# Patient Record
Sex: FEMALE | Race: BLACK OR AFRICAN AMERICAN | Employment: FULL TIME | URBAN - METROPOLITAN AREA
[De-identification: names, ages, dates, MRNs, and addresses within clinical notes are randomized per-mention and may not be internally consistent; named-entity substitution may affect disease eponyms.]

---

## 2022-10-14 ENCOUNTER — OFFICE VISIT (OUTPATIENT)
Dept: INTERNAL MEDICINE CLINIC | Age: 27
End: 2022-10-14
Payer: COMMERCIAL

## 2022-10-14 VITALS
RESPIRATION RATE: 18 BRPM | WEIGHT: 232.9 LBS | OXYGEN SATURATION: 98 % | TEMPERATURE: 98 F | HEIGHT: 67 IN | SYSTOLIC BLOOD PRESSURE: 118 MMHG | DIASTOLIC BLOOD PRESSURE: 74 MMHG | BODY MASS INDEX: 36.55 KG/M2 | HEART RATE: 100 BPM

## 2022-10-14 DIAGNOSIS — E78.5 HYPERLIPIDEMIA, UNSPECIFIED HYPERLIPIDEMIA TYPE: ICD-10-CM

## 2022-10-14 DIAGNOSIS — E11.3293 TYPE 2 DIABETES MELLITUS WITH BOTH EYES AFFECTED BY MILD NONPROLIFERATIVE RETINOPATHY WITHOUT MACULAR EDEMA, WITH LONG-TERM CURRENT USE OF INSULIN (HCC): Primary | ICD-10-CM

## 2022-10-14 DIAGNOSIS — Z11.59 ENCOUNTER FOR HEPATITIS C SCREENING TEST FOR LOW RISK PATIENT: ICD-10-CM

## 2022-10-14 DIAGNOSIS — Z23 ENCOUNTER FOR IMMUNIZATION: ICD-10-CM

## 2022-10-14 DIAGNOSIS — B97.7 HPV IN FEMALE: ICD-10-CM

## 2022-10-14 DIAGNOSIS — Z79.4 TYPE 2 DIABETES MELLITUS WITH BOTH EYES AFFECTED BY MILD NONPROLIFERATIVE RETINOPATHY WITHOUT MACULAR EDEMA, WITH LONG-TERM CURRENT USE OF INSULIN (HCC): Primary | ICD-10-CM

## 2022-10-14 DIAGNOSIS — E66.01 CLASS 2 SEVERE OBESITY DUE TO EXCESS CALORIES WITH SERIOUS COMORBIDITY AND BODY MASS INDEX (BMI) OF 36.0 TO 36.9 IN ADULT (HCC): ICD-10-CM

## 2022-10-14 DIAGNOSIS — Z79.899 ENCOUNTER FOR LONG-TERM (CURRENT) USE OF OTHER MEDICATIONS: ICD-10-CM

## 2022-10-14 PROCEDURE — 3046F HEMOGLOBIN A1C LEVEL >9.0%: CPT | Performed by: FAMILY MEDICINE

## 2022-10-14 PROCEDURE — 99204 OFFICE O/P NEW MOD 45 MIN: CPT | Performed by: FAMILY MEDICINE

## 2022-10-14 PROCEDURE — 90471 IMMUNIZATION ADMIN: CPT | Performed by: FAMILY MEDICINE

## 2022-10-14 PROCEDURE — 90686 IIV4 VACC NO PRSV 0.5 ML IM: CPT | Performed by: FAMILY MEDICINE

## 2022-10-14 RX ORDER — INSULIN ASPART 100 [IU]/ML
INJECTION, SOLUTION INTRAVENOUS; SUBCUTANEOUS
COMMUNITY
Start: 2022-09-05

## 2022-10-14 RX ORDER — INSULIN DEGLUDEC INJECTION 200 U/ML
INJECTION, SOLUTION SUBCUTANEOUS
COMMUNITY
Start: 2022-09-05

## 2022-10-14 RX ORDER — MEDROXYPROGESTERONE ACETATE 150 MG/ML
INJECTION, SUSPENSION INTRAMUSCULAR
COMMUNITY
Start: 2022-07-22

## 2022-10-14 RX ORDER — BLOOD-GLUCOSE SENSOR
EACH MISCELLANEOUS
COMMUNITY
Start: 2022-10-05

## 2022-10-14 RX ORDER — BLOOD-GLUCOSE TRANSMITTER
EACH MISCELLANEOUS
COMMUNITY
Start: 2022-09-05

## 2022-10-14 NOTE — PROGRESS NOTES
SPORTS MEDICINE AND PRIMARY CARE  Poonam Tuttle. MD [place]  [address] [address]    Chief Complaint   Patient presents with    Establish Care       SUBJECTIVE:    Shabana Moreno is a 32 y.o. female for new patient evaluation    T1dm dx 2010  Insulin pump interest  A1c 6/10/22 12.7%  Bilateral retinopathy, mild    Diabetic ROS: rare hypoglycemia,  no polyuria, no polydipsia, no blurred vision, +  left hand paresthesias, no weight changes or pruritis, no infections or skin issues;  feet and nails are intact     Cardiovascular ROS: taking medications as instructed, no medication side effects noted, no TIA's, no chest pain on exertion, no dyspnea on exertion, no swelling of ankles    Todays Fbs 219, ac lunch 240, random 319  Protein bar , water  Chick tarik tenders 4 piece, side salad, avocado lime , water    Care gaps  Covid  vacc x 3, pfizer  Tdap- utd, 2015  Pap smear- 6/2021  hpv hx      Ophth oct appt  Current Outpatient Medications   Medication Sig Dispense Refill    NovoLOG Flexpen U-100 Insulin 100 unit/mL (3 mL) inpn INJECT UP TO 60 UNITS DAILY AS DIRECTED BY PHYSICIAN      Tresiba FlexTouch U-200 200 unit/mL (3 mL) inpn pen INJECT 50 UNITS UNDER THE SKIN EVERY MORNING      medroxyPROGESTERone (DEPO-PROVERA) 150 mg/mL syrg ADMINISTER 1 ML IN THE MUSCLE 1 TIME EVERY 12 WEEKS      Dexcom G6 Sensor wagner       Dexcom G6 Transmitter wagner USE ONE BOX EVERY 3 MONTHS.        Past Medical History:   Diagnosis Date    Diabetes ([address]      Past Surgical History:   Procedure Laterality Date    HX CHOLECYSTECTOMY       Allergies   Allergen Reactions    Lisinopril Swelling       REVIEW OF SYSTEMS:  General: negative for - chills or fever  ENT: negative for - headaches, nasal congestion, tinnitus, hearing loss, vision changes, sore throat  Respiratory: negative for - cough, hemoptysis, shortness of breath or wheezing  Cardiovascular : negative for - chest pain, edema, palpitations or shortness of breath  Gastrointestinal: negative for - abdominal pain, blood in stools, heartburn or nausea/vomiting, diarrhea, constipation  Genito-Urinary: no dysuria, trouble voiding, hematuria Musculoskeletal: negative for - gait disturbance, joint pain, joint stiffness , joint swelling, muscle aches  Neurological: no TIA or stroke symptoms  Hematologic: no bruises, no bleeding, no swollen glands  Integument: no lumps, mole changes, nail changes or rash  Endocrine:no malaise/lethargy or unexpected weight changes      Social History     Socioeconomic History    Marital status: SINGLE   Tobacco Use    Smoking status: Never    Smokeless tobacco: Never   Substance and Sexual Activity    Alcohol use: Not Currently    Drug use: Never     No family history on file. OBJECTIVE:     Visit Vitals  /74 (BP 1 Location: Right arm, BP Patient Position: Sitting)   Pulse 100   Temp 98 °F (36.7 °C) (Oral)   Resp 18   Ht 5' 7\" (1.702 m)   Wt 232 lb 14.4 oz (105.6 kg)   SpO2 98%   BMI 36.48 kg/m²       No results found for any previous visit. Appearance: alert, well appearing, and in no distress. General exam: CVS exam BP noted to be well controlled today in office, S1, S2 normal, no gallop, no murmur, chest clear, no JVD, no HSM, no edema,  peripheral vascular exam both carotids normal upstroke without bruits, radial pulses normal, pedal pulses normal both DP's and PT's, neurological exam alert, oriented, normal speech, no focal findings or movement disorder noted. ASSESSMENT/PLAN:  1. Type 2 diabetes mellitus with both eyes affected by mild nonproliferative retinopathy without macular edema, with long-term current use of insulin (HCC)      Need to assess control with a1c    .   Orders Placed This Encounter    REFERRAL TO ENDOCRINOLOGY    NovoLOG Flexpen U-100 Insulin 100 unit/mL (3 mL) inpn    Tresiba FlexTouch U-200 200 unit/mL (3 mL) inpn pen    medroxyPROGESTERone (DEPO-PROVERA) 150 mg/mL syrg    Dexcom G6 Sensor wagner Dexcom G6 Transmitter wagner           I have discussed the diagnosis with the patient and the intended plan as seen in the  orders above. The patient understands and agrees with the plan. The patient has   received an after visit summary. Questions were answered concerning  future plans  Patient labs and/or xrays were reviewed as available. Past records were reviewed as available. Counseled regarding diet, exercise and healthy lifestyle          Advised patient to proceed to urgent care, call back or return to office if symptoms develop/worsen/change/persist.  Discussed expected course/resolution/complications of diagnosis in detail with patient. Medication risks/benefits/costs/interactions/alternatives reviewed    Signed,  Amanda Oro M.D. This note was created using voice recognition software.   Edits have been made but syntax errors might exist.

## 2022-10-15 LAB
ALBUMIN SERPL-MCNC: 3.6 G/DL (ref 3.5–5)
ALBUMIN/GLOB SERPL: 1.1 {RATIO} (ref 1.1–2.2)
ALP SERPL-CCNC: 158 U/L (ref 45–117)
ALT SERPL-CCNC: 29 U/L (ref 12–78)
ANION GAP SERPL CALC-SCNC: 5 MMOL/L (ref 5–15)
APPEARANCE UR: CLEAR
AST SERPL-CCNC: 15 U/L (ref 15–37)
BASOPHILS # BLD: 0.1 K/UL (ref 0–0.1)
BASOPHILS NFR BLD: 1 % (ref 0–1)
BILIRUB SERPL-MCNC: 0.2 MG/DL (ref 0.2–1)
BILIRUB UR QL: NEGATIVE
BUN SERPL-MCNC: 11 MG/DL (ref 6–20)
BUN/CREAT SERPL: 13 (ref 12–20)
CALCIUM SERPL-MCNC: 9.4 MG/DL (ref 8.5–10.1)
CHLORIDE SERPL-SCNC: 108 MMOL/L (ref 97–108)
CHOLEST SERPL-MCNC: 162 MG/DL
CO2 SERPL-SCNC: 29 MMOL/L (ref 21–32)
COLOR UR: ABNORMAL
COMMENT, HOLDF: NORMAL
CREAT SERPL-MCNC: 0.85 MG/DL (ref 0.55–1.02)
CREAT UR-MCNC: 143 MG/DL
DIFFERENTIAL METHOD BLD: ABNORMAL
EOSINOPHIL # BLD: 0.3 K/UL (ref 0–0.4)
EOSINOPHIL NFR BLD: 3 % (ref 0–7)
ERYTHROCYTE [DISTWIDTH] IN BLOOD BY AUTOMATED COUNT: 13.8 % (ref 11.5–14.5)
EST. AVERAGE GLUCOSE BLD GHB EST-MCNC: 326 MG/DL
GLOBULIN SER CALC-MCNC: 3.3 G/DL (ref 2–4)
GLUCOSE SERPL-MCNC: 212 MG/DL (ref 65–100)
GLUCOSE UR STRIP.AUTO-MCNC: >1000 MG/DL
HBA1C MFR BLD: 13 % (ref 4–5.6)
HCT VFR BLD AUTO: 43.4 % (ref 35–47)
HCV AB SERPL QL IA: NONREACTIVE
HDLC SERPL-MCNC: 55 MG/DL
HDLC SERPL: 2.9 {RATIO} (ref 0–5)
HGB BLD-MCNC: 13.4 G/DL (ref 11.5–16)
HGB UR QL STRIP: NEGATIVE
IMM GRANULOCYTES # BLD AUTO: 0 K/UL (ref 0–0.04)
IMM GRANULOCYTES NFR BLD AUTO: 0 % (ref 0–0.5)
KETONES UR QL STRIP.AUTO: NEGATIVE MG/DL
LDLC SERPL CALC-MCNC: 61.4 MG/DL (ref 0–100)
LEUKOCYTE ESTERASE UR QL STRIP.AUTO: NEGATIVE
LYMPHOCYTES # BLD: 2.2 K/UL (ref 0.8–3.5)
LYMPHOCYTES NFR BLD: 23 % (ref 12–49)
MCH RBC QN AUTO: 25 PG (ref 26–34)
MCHC RBC AUTO-ENTMCNC: 30.9 G/DL (ref 30–36.5)
MCV RBC AUTO: 81.1 FL (ref 80–99)
MICROALBUMIN UR-MCNC: 1.01 MG/DL
MICROALBUMIN/CREAT UR-RTO: 7 MG/G (ref 0–30)
MONOCYTES # BLD: 0.5 K/UL (ref 0–1)
MONOCYTES NFR BLD: 5 % (ref 5–13)
NEUTS SEG # BLD: 6.8 K/UL (ref 1.8–8)
NEUTS SEG NFR BLD: 68 % (ref 32–75)
NITRITE UR QL STRIP.AUTO: NEGATIVE
NRBC # BLD: 0 K/UL (ref 0–0.01)
NRBC BLD-RTO: 0 PER 100 WBC
PH UR STRIP: 6.5 [PH] (ref 5–8)
PLATELET # BLD AUTO: 399 K/UL (ref 150–400)
PMV BLD AUTO: 11 FL (ref 8.9–12.9)
POTASSIUM SERPL-SCNC: 4.4 MMOL/L (ref 3.5–5.1)
PROT SERPL-MCNC: 6.9 G/DL (ref 6.4–8.2)
PROT UR STRIP-MCNC: NEGATIVE MG/DL
RBC # BLD AUTO: 5.35 M/UL (ref 3.8–5.2)
SAMPLES BEING HELD,HOLD: NORMAL
SODIUM SERPL-SCNC: 142 MMOL/L (ref 136–145)
SP GR UR REFRACTOMETRY: 1.02 (ref 1–1.03)
TRIGL SERPL-MCNC: 228 MG/DL (ref ?–150)
TSH SERPL DL<=0.05 MIU/L-ACNC: 1.06 UIU/ML (ref 0.36–3.74)
UROBILINOGEN UR QL STRIP.AUTO: 0.2 EU/DL (ref 0.2–1)
VLDLC SERPL CALC-MCNC: 45.6 MG/DL
WBC # BLD AUTO: 9.9 K/UL (ref 3.6–11)

## 2022-12-16 NOTE — PROGRESS NOTES
Alert pt:  Diabetic control is very poor. Follow up is needed very soon. Diabetic specialty referral remains unscheduled. Feel free to set your appointment information is located in [address] [place] [address] [address].

## 2022-12-20 NOTE — PROGRESS NOTES
Pt notified of the above. Pt states that she has scheduled an appointment with Endocrine and her appointment is at the end of January. States she has been a diabetic for awhile and her A1C has been running between 11 and 14. States her BS this morning was 215 and   Has been running in the 200's. States she will call to schedule an appointment with one of the other providers here in the office.

## 2023-01-22 ENCOUNTER — APPOINTMENT (OUTPATIENT)
Dept: CT IMAGING | Age: 28
End: 2023-01-22
Attending: EMERGENCY MEDICINE
Payer: COMMERCIAL

## 2023-01-22 ENCOUNTER — HOSPITAL ENCOUNTER (EMERGENCY)
Age: 28
Discharge: HOME | End: 2023-01-22
Attending: EMERGENCY MEDICINE
Payer: COMMERCIAL

## 2023-01-22 VITALS
TEMPERATURE: 97.4 F | OXYGEN SATURATION: 96 % | SYSTOLIC BLOOD PRESSURE: 144 MMHG | RESPIRATION RATE: 20 BRPM | DIASTOLIC BLOOD PRESSURE: 84 MMHG | HEART RATE: 86 BPM

## 2023-01-22 DIAGNOSIS — R10.31 ABDOMINAL PAIN, RIGHT LOWER QUADRANT: ICD-10-CM

## 2023-01-22 DIAGNOSIS — R10.9 RIGHT FLANK PAIN: Primary | ICD-10-CM

## 2023-01-22 LAB
ALBUMIN SERPL-MCNC: 3.4 G/DL (ref 3.5–5)
ALBUMIN/GLOB SERPL: 0.9 (ref 1.1–2.2)
ALP SERPL-CCNC: 143 U/L (ref 45–117)
ALT SERPL-CCNC: 30 U/L (ref 12–78)
ANION GAP SERPL CALC-SCNC: 4 MMOL/L (ref 5–15)
APPEARANCE UR: CLEAR
AST SERPL-CCNC: 19 U/L (ref 15–37)
BACTERIA URNS QL MICRO: ABNORMAL /HPF
BASOPHILS # BLD: 0.1 K/UL (ref 0–0.1)
BASOPHILS NFR BLD: 1 % (ref 0–1)
BILIRUB SERPL-MCNC: 0.3 MG/DL (ref 0.2–1)
BILIRUB UR QL: NEGATIVE
BUN SERPL-MCNC: 15 MG/DL (ref 6–20)
BUN/CREAT SERPL: 22 (ref 12–20)
CALCIUM SERPL-MCNC: 9.4 MG/DL (ref 8.5–10.1)
CHLORIDE SERPL-SCNC: 110 MMOL/L (ref 97–108)
CO2 SERPL-SCNC: 28 MMOL/L (ref 21–32)
COLOR UR: ABNORMAL
CREAT SERPL-MCNC: 0.69 MG/DL (ref 0.55–1.02)
DIFFERENTIAL METHOD BLD: ABNORMAL
EOSINOPHIL # BLD: 0.1 K/UL (ref 0–0.4)
EOSINOPHIL NFR BLD: 1 % (ref 0–7)
EPITH CASTS URNS QL MICRO: ABNORMAL /LPF
ERYTHROCYTE [DISTWIDTH] IN BLOOD BY AUTOMATED COUNT: 13.7 % (ref 11.5–14.5)
EST. AVERAGE GLUCOSE BLD GHB EST-MCNC: 335 MG/DL
GLOBULIN SER CALC-MCNC: 3.7 G/DL (ref 2–4)
GLUCOSE SERPL-MCNC: 115 MG/DL (ref 65–100)
GLUCOSE UR STRIP.AUTO-MCNC: NEGATIVE MG/DL
HBA1C MFR BLD: 13.3 % (ref 4–5.6)
HCG UR QL: NEGATIVE
HCT VFR BLD AUTO: 41.1 % (ref 35–47)
HGB BLD-MCNC: 12.5 G/DL (ref 11.5–16)
HGB UR QL STRIP: NEGATIVE
IMM GRANULOCYTES # BLD AUTO: 0 K/UL (ref 0–0.04)
IMM GRANULOCYTES NFR BLD AUTO: 0 % (ref 0–0.5)
KETONES UR QL STRIP.AUTO: NEGATIVE MG/DL
LEUKOCYTE ESTERASE UR QL STRIP.AUTO: NEGATIVE
LIPASE SERPL-CCNC: 69 U/L (ref 73–393)
LYMPHOCYTES # BLD: 2.2 K/UL (ref 0.8–3.5)
LYMPHOCYTES NFR BLD: 24 % (ref 12–49)
MCH RBC QN AUTO: 24.7 PG (ref 26–34)
MCHC RBC AUTO-ENTMCNC: 30.4 G/DL (ref 30–36.5)
MCV RBC AUTO: 81.2 FL (ref 80–99)
MONOCYTES # BLD: 0.6 K/UL (ref 0–1)
MONOCYTES NFR BLD: 6 % (ref 5–13)
NEUTS SEG # BLD: 6.5 K/UL (ref 1.8–8)
NEUTS SEG NFR BLD: 68 % (ref 32–75)
NITRITE UR QL STRIP.AUTO: NEGATIVE
NRBC # BLD: 0 K/UL (ref 0–0.01)
NRBC BLD-RTO: 0 PER 100 WBC
PH UR STRIP: 6 (ref 5–8)
PLATELET # BLD AUTO: 360 K/UL (ref 150–400)
PMV BLD AUTO: 9.8 FL (ref 8.9–12.9)
POTASSIUM SERPL-SCNC: 4.1 MMOL/L (ref 3.5–5.1)
PROT SERPL-MCNC: 7.1 G/DL (ref 6.4–8.2)
PROT UR STRIP-MCNC: NEGATIVE MG/DL
RBC # BLD AUTO: 5.06 M/UL (ref 3.8–5.2)
RBC #/AREA URNS HPF: ABNORMAL /HPF (ref 0–5)
SODIUM SERPL-SCNC: 142 MMOL/L (ref 136–145)
SP GR UR REFRACTOMETRY: 1.01 (ref 1–1.03)
UR CULT HOLD, URHOLD: NORMAL
UROBILINOGEN UR QL STRIP.AUTO: 0.2 EU/DL (ref 0.2–1)
WBC # BLD AUTO: 9.5 K/UL (ref 3.6–11)
WBC URNS QL MICRO: ABNORMAL /HPF (ref 0–4)

## 2023-01-22 PROCEDURE — 74011250636 HC RX REV CODE- 250/636: Performed by: EMERGENCY MEDICINE

## 2023-01-22 PROCEDURE — 80053 COMPREHEN METABOLIC PANEL: CPT

## 2023-01-22 PROCEDURE — 96374 THER/PROPH/DIAG INJ IV PUSH: CPT

## 2023-01-22 PROCEDURE — 74011000250 HC RX REV CODE- 250: Performed by: EMERGENCY MEDICINE

## 2023-01-22 PROCEDURE — 81025 URINE PREGNANCY TEST: CPT

## 2023-01-22 PROCEDURE — 36415 COLL VENOUS BLD VENIPUNCTURE: CPT

## 2023-01-22 PROCEDURE — 99284 EMERGENCY DEPT VISIT MOD MDM: CPT

## 2023-01-22 PROCEDURE — 96375 TX/PRO/DX INJ NEW DRUG ADDON: CPT

## 2023-01-22 PROCEDURE — 74176 CT ABD & PELVIS W/O CONTRAST: CPT

## 2023-01-22 PROCEDURE — 83036 HEMOGLOBIN GLYCOSYLATED A1C: CPT

## 2023-01-22 PROCEDURE — 85025 COMPLETE CBC W/AUTO DIFF WBC: CPT

## 2023-01-22 PROCEDURE — 81001 URINALYSIS AUTO W/SCOPE: CPT

## 2023-01-22 PROCEDURE — 83690 ASSAY OF LIPASE: CPT

## 2023-01-22 RX ORDER — LIDOCAINE 50 MG/G
PATCH TOPICAL
Qty: 5 EACH | Refills: 1 | Status: SHIPPED | OUTPATIENT
Start: 2023-01-22

## 2023-01-22 RX ORDER — DICYCLOMINE HYDROCHLORIDE 20 MG/1
20 TABLET ORAL EVERY 6 HOURS
Qty: 20 TABLET | Refills: 0 | Status: SHIPPED | OUTPATIENT
Start: 2023-01-22

## 2023-01-22 RX ORDER — ONDANSETRON 2 MG/ML
4 INJECTION INTRAMUSCULAR; INTRAVENOUS
Status: COMPLETED | OUTPATIENT
Start: 2023-01-22 | End: 2023-01-22

## 2023-01-22 RX ORDER — KETOROLAC TROMETHAMINE 30 MG/ML
15 INJECTION, SOLUTION INTRAMUSCULAR; INTRAVENOUS
Status: COMPLETED | OUTPATIENT
Start: 2023-01-22 | End: 2023-01-22

## 2023-01-22 RX ADMIN — ONDANSETRON HYDROCHLORIDE 4 MG: 2 SOLUTION INTRAMUSCULAR; INTRAVENOUS at 12:53

## 2023-01-22 RX ADMIN — FAMOTIDINE 20 MG: 10 INJECTION, SOLUTION INTRAVENOUS at 12:56

## 2023-01-22 RX ADMIN — KETOROLAC TROMETHAMINE 15 MG: 30 INJECTION, SOLUTION INTRAMUSCULAR; INTRAVENOUS at 12:55

## 2023-01-22 NOTE — Clinical Note
[address]  [address] [facility] [address]  [phone]    Work/School Note    Date: 1/22/2023    To Whom It May concern:    Fernando Foster was seen and treated today in the emergency room by the following provider(s):  Attending Provider: Roberth Quesada MD  Nurse Practitioner: Harini Morris NP. Fernando Foster is excused from work/school on 1/22/2023 through 1/24/2023. She is medically clear to return to work/school on 1/25/2023.          Sincerely,          Clem Felder NP

## 2023-01-22 NOTE — ED PROVIDER NOTES
Flank Pain   Associated symptoms include abdominal pain. Pertinent negatives include no chest pain, no fever, no headaches and no dysuria. Patient is a 80-year-old female with past medical history significant for type 2 diabetes on insulin who presents to the ED with abrupt onset of right flank pain that radiates to the right lower quadrant since last evening. She was evaluated at an urgent care earlier today and referred to the emergency room for further evaluation and treatment. She denies any falls, direct injury or blunt trauma. Denies fever, cough, cold symptoms, headache, neck pain, visual changes, focal weakness or rash. Denies any difficulty breathing, difficulty swallowing, SOB or chest pain. Denies any nausea, vomiting or diarrhea. Pt. Reports she has not had any pain medications today prior to arrival.  Old charts reviewed. Pt is on Depo-Provera birth control. Past Medical History:   Diagnosis Date    Diabetes [facility])        Past Surgical History:   Procedure Laterality Date    HX CHOLECYSTECTOMY           History reviewed. No pertinent family history.     Social History     Socioeconomic History    Marital status: SINGLE     Spouse name: Not on file    Number of children: Not on file    Years of education: Not on file    Highest education level: Not on file   Occupational History    Not on file   Tobacco Use    Smoking status: Never    Smokeless tobacco: Never   Substance and Sexual Activity    Alcohol use: Not Currently    Drug use: Never    Sexual activity: Not on file   Other Topics Concern    Not on file   Social History Narrative    Not on file     Social Determinants of Health     Financial Resource Strain: Not on file   Food Insecurity: Not on file   Transportation Needs: Not on file   Physical Activity: Not on file   Stress: Not on file   Social Connections: Not on file   Intimate Partner Violence: Not on file   Housing Stability: Not on file         ALLERGIES: Lisinopril    Review of Systems   Constitutional:  Negative for activity change, appetite change, fever and unexpected weight change. HENT:  Negative for congestion, rhinorrhea and sore throat. Eyes:  Negative for visual disturbance. Respiratory:  Negative for cough and shortness of breath. Cardiovascular:  Negative for chest pain, palpitations and leg swelling. Gastrointestinal:  Positive for abdominal pain. Negative for constipation, diarrhea, nausea and vomiting. Genitourinary:  Positive for flank pain. Negative for dysuria, hematuria, vaginal bleeding and vaginal discharge. Musculoskeletal:  Positive for back pain. Negative for myalgias. Skin:  Negative for rash. Neurological:  Negative for headaches. All other systems reviewed and are negative. Vitals:    01/22/23 1212   BP: (!) 144/84   Pulse: 86   Resp: 20   Temp: 97.4 °F (36.3 °C)   SpO2: 96%            Physical Exam  Vitals and nursing note reviewed. Constitutional:       General: She is not in acute distress. Appearance: Normal appearance. She is not ill-appearing, toxic-appearing or diaphoretic. Comments: Female ; non smoker; works at [employer]  [phone]:      Head: Normocephalic and atraumatic. Right Ear: Tympanic membrane normal.      Nose: Nose normal.      Mouth/Throat:      Mouth: Mucous membranes are moist.      Pharynx: No posterior oropharyngeal erythema. Cardiovascular:      Rate and Rhythm: Normal rate and regular rhythm. Pulmonary:      Effort: Pulmonary effort is normal.      Breath sounds: Normal breath sounds. Abdominal:      General: Bowel sounds are normal. There is no distension. Palpations: Abdomen is soft. Tenderness: There is abdominal tenderness. There is no guarding or rebound. Hernia: No hernia is present. Comments: Mid to Right lower quadrant tenderness   Musculoskeletal:         General: Tenderness present. Cervical back: Normal range of motion and neck supple. No tenderness.       Comments: Reports right flank tenderness with radiculopathy to the right lower quadrant of the abdomen; Skin integrity is intact. There is no obvious bony or soft tissue deformity; no rash, bruising or erythema. Good neurovascular sensation. Lymphadenopathy:      Cervical: No cervical adenopathy. Skin:     General: Skin is warm and dry. Findings: No bruising, erythema or rash. Neurological:      Mental Status: She is alert and oriented to person, place, and time. Medical Decision Making  Amount and/or Complexity of Data Reviewed  Labs: ordered. Radiology: ordered. Risk  Prescription drug management. Procedures      Labs Reviewed   URINALYSIS W/MICROSCOPIC - Abnormal; Notable for the following components:       Result Value    Bacteria 1+ (*)     All other components within normal limits   CBC WITH AUTOMATED DIFF - Abnormal; Notable for the following components:    MCH 24.7 (*)     All other components within normal limits   METABOLIC PANEL, COMPREHENSIVE - Abnormal; Notable for the following components:    Chloride 110 (*)     Anion gap 4 (*)     Glucose 115 (*)     BUN/Creatinine ratio 22 (*)     Alk. phosphatase 143 (*)     Albumin 3.4 (*)     A-G Ratio 0.9 (*)     All other components within normal limits   LIPASE - Abnormal; Notable for the following components:    Lipase 69 (*)     All other components within normal limits   HEMOGLOBIN A1C WITH EAG - Abnormal; Notable for the following components:    Hemoglobin A1c 13.3 (*)     All other components within normal limits   URINE CULTURE HOLD SAMPLE   HCG URINE, QL. - POC     CT ABD PELV WO CONT    Result Date: 1/22/2023  No acute findings in the abdomen/pelvis. No evidence of kidney stones. Patient has been reexamined and reports some relief of with medications given. Recommend close follow-up with PCP and/or endocrinology for further evaluation and treatment.      1:40 PM  Patient's results and plan of care have been reviewed with her.   Patient has verbally conveyed her understanding and agreement of her signs, symptoms, diagnosis, treatment and prognosis and additionally agrees to follow up as recommended or return to the Emergency Room should her condition change prior to follow-up. Discharge instructions have also been provided to the patient with some educational information regarding her diagnosis as well a list of reasons why she would want to return to the ER prior to her follow-up appointment should her condition change. Kate Crews, NP

## 2023-01-22 NOTE — ED TRIAGE NOTES
Pt referred by [facility]  for RIGHT flank pain that radiates to RIGHT abd. Denies urinary s/s.  Denies n/v/d.

## 2023-01-31 ENCOUNTER — VIRTUAL VISIT (OUTPATIENT)
Dept: ENDOCRINOLOGY | Age: 28
End: 2023-01-31
Payer: COMMERCIAL

## 2023-01-31 DIAGNOSIS — E78.2 MIXED HYPERLIPIDEMIA: ICD-10-CM

## 2023-01-31 DIAGNOSIS — E10.649 HYPOGLYCEMIA UNAWARENESS ASSOCIATED WITH TYPE 1 DIABETES MELLITUS (HCC): ICD-10-CM

## 2023-01-31 DIAGNOSIS — E10.65 TYPE 1 DIABETES MELLITUS WITH HYPERGLYCEMIA (HCC): Primary | ICD-10-CM

## 2023-01-31 PROCEDURE — 99204 OFFICE O/P NEW MOD 45 MIN: CPT | Performed by: GENERAL ACUTE CARE HOSPITAL

## 2023-01-31 PROCEDURE — 3046F HEMOGLOBIN A1C LEVEL >9.0%: CPT | Performed by: GENERAL ACUTE CARE HOSPITAL

## 2023-01-31 RX ORDER — INSULIN PMP CART,AUT,G6/7,CNTR
1 EACH SUBCUTANEOUS
Qty: 1 KIT | Refills: 0 | Status: SHIPPED | OUTPATIENT
Start: 2023-01-31

## 2023-01-31 RX ORDER — INSULIN PMP CART,AUT,G6/7,CNTR
10 EACH SUBCUTANEOUS
Qty: 10 EACH | Refills: 12 | Status: SHIPPED | OUTPATIENT
Start: 2023-01-31

## 2023-01-31 NOTE — PATIENT INSTRUCTIONS
BS targets :  AM   Rest of the day 200    Take:  Tresiba  44 units at bedtime  Novolog 20 units and sliding scale for breakfast and lunch and take more units for dinner by 2 to 3 units if drinking soda, you can cut back if not drinking soda or smaller food portions  Correction Scale:   1 unit for every 40 above 150    IF GLUCOSE IS:                 THEN TAKE:      0   Extra Unit  151-190   1   Extra Unit  191-230   2   Extra Units  231-270   3   Extra Units  271-310   4   Extra Units  311-350   5   Extra Units  >350    6   Extra Units    Example: My planned insulin dose:    ____ Units    +    ____ Extra Correction Units  =  ____ total units to take together as one injection. Please send the Dexcom report pdf file to me by email  Please remember to message me through [employer] your [employer] company for Dexcom supplies    INFORMATION FOR NEW DIABETES PATIENTS ON INSULIN:    I would like to welcome you to our Diabetes & Endocrinology clinic. We want to do our best to help you take the best care of your diabetes. I would like for you to read this fact sheet which will have some important information for you regarding your treatment with insulin. What is my HbA1c (hemoglobin A1c) ? Blood sugar is very sticky and if left elevated for long enough, it will stick to just about everything in your body. This includes enzymes which can no longer function properly and the lining of your blood vessels which can get damaged and result in damaged organs. It also sticks to your hemoglobin when your red blood cells are being produced and measuring this can provide us with a good idea of what your blood sugar average has been over the past 3 months. Most of the time we aim for a HbA1c value of 7% but this can be different for certain people under certain circumstances. What kinds of insulin are usually used ?   Many years ago the types of insulin available were limited but now there are several different options to use. The important thing to know is that there are SHORT acting insulins which are used to treat the glucose elevation from your meals, and there are LONG acting insulins which are used as a basal or background insulin that provide a background sugar control throughout the day and night. You may be only on a LONG acting insulin, or you may be on a combination of LONG and SHORT acting insulins. It is important that during and following each visit you have a good understanding of your own personal regimen. In some instances there are pre-mixed insulins in which a short and intermediate acting insulin are combined in one vial or pen. Why do I need to keep a glucose log ? It is not possible to properly make changes to your insulin dose unless we know what your glucose values are at home. Using your HbA1c we can only have a general idea of whether your glucose has been controlled or uncontrolled, but it will not inform us on your day-to-day and [id number] blood sugar control. If you present to clinic without your glucose log, you may be wasting your visit rather than having a more meaningful visit since medication adjustments will be limited. What do I do if I have persistently HIGH or LOW glucose at home between my visits ? It is not necessary to wait until your next appointment before making any changes in your insulin dose if you are having persistent high or low blood sugar at home. As discussed in clinic today, we would like to aim for a fasting glucose goal/target of 130-150 in the AM and also at bedtime, while avoiding any hypoglycemia (low glucose level). For persistent hyperglycemia (Highs) related to meals, defined as being above your glucose target, increase your mealtime insulin by 1 unit every 3-4 days as appropriate until the target is achieved. For persistent mild hypoglycemia (Lows), decrease the dose instead.     For persistent hyperglycemia not related to meals, as can be interpreted by your fasting AM glucose, increase your once daily long acting insulin by 1 unit every 3-4 days until the target fasting glucose has been achieved. For persistent mild hypoglycemia, decrease the dose instead. If your blood glucose is persistently low, if you are having any related symptoms, or if you are just not certain of how to adjust your insulin, please notify your doctor for advise on dose adjustment. How to treat low blood glucose ? 1. Consume 15-20 grams of glucose or simple carbohydrates. 2. Recheck your blood glucose after 15 minutes. 3. If hypoglycemia continues, repeat. 4. Once blood glucose returns to normal, eat a small snack if your next planned meal or snack is more than an hour or two away. What is a Sliding Scale ? Generally a sliding scale is just a set of instructions for how much insulin to take for a specific glucose range. This generally is not often used anymore because a sliding scale does not adequately treat your meal as it is intended. HOWEVER we do use a version of the sliding scale, known as a CORRECTION SCALE, and this is used IN ADDITION to your scheduled mealtime insulin to account for a blood glucose which is already high before eating the meal. Typically it will be a set of instructions which advise you of how much more insulin to ADD to your mealtime insulin dose if your glucose is already above goal. Not everyone has a correction scale, however you may be advised of one in the future. What other things should I do to always be prepared ? Glucose tablets. Thats right, you need to be prepared just in case you get low blood sugar which can cause you to have very uncomfortable symptoms. Generally it is a good idea to keep some in your car, in your bedroom, and at work/school just in case. Diabetes ID. It is important for others to know that you are diabetic and on insulin in case for some reason you are not able to communicate with others.  This can happen if you pass out due to severely low blood sugar for example. IDs come as bracelets, necklaces, and dog tags. Check with your pharmacy about obtaining an ID and wear it wherever you go. It will be important to continue checking your glucose just as you did previously. I would like you at the very least to check you glucose during:   + AM fasting before breakfast   + Dinner time   + Bedtime  And any other time that you are not feeling well. Always provide a glucose log that is completed at every visit so that we can review the results of your home glucose together. Without this, it is not possible to make accurate changes to your insulin doses. Please consider checking with your pharmacy about obtaining a diabetes medical alert ID, if you have not already. This can come in the form of a bracelet or \"dog tags\". It is important for others to know that you are diabetic on insulin, especially if you become ill and are unable to speak. Diabetes and Meal Planning    Meal planning can be a key part of managing diabetes. Planning meals and snacks with the right balance of carbohydrate, protein, and fat can help you keep your blood sugar at the target level. You don't have to eat special foods. You can eat what your family eats, including sweets once in a while. But you do have to pay attention to how often you eat and how much you eat of certain foods. Your plate  The plate format is a simple way to help you manage how you eat. You plan meals by learning how much space each food should take on a plate. It can make it easier to keep your blood sugar level within your target range. It also helps you see if you're eating healthy portion sizes. To use the plate format, you put non-starchy vegetables on half your plate. Add lean protein foods, such as fish, lean meats and poultry, or soy products, on one-quarter of the plate.  Put a grain or starchy vegetable (such as brown rice or a potato) on the final quarter of the plate. You can add a small piece of fruit and some low-fat or fat-free milk or yogurt, depending on your carbohydrate goal for each meal.  Make sure that you are not using an oversized plate. A 9-inch plate is best.    Carbohydrates  Carbohydrate raises blood sugar higher and more quickly than any other nutrient. It is found in desserts, breads and cereals, and fruit. It's also found in starchy vegetables such as potatoes and corn, grains such as rice and pasta, and milk and yogurt. You can help keep your blood sugar levels within your target range by planning how much carbohydrate to have at meals and snacks. The amount you need depends on several things. These include your weight, how active you are, which diabetes medicines you take, and what your goals are for your blood sugar levels. An example of a carbohydrate counting plan is:  45 to 60 grams at each meal. That's about the same as 3 to 4 carbohydrate servings. 15 to 20 grams at each snack. That's about the same as 1 carbohydrate serving. The Nutrition Facts label on packaged foods tells you how much carbohydrate is in a serving of the food. First, look at the serving size on the food label. All of the nutrition information on a food label is based on that serving size. For foods that don't come with labels, such as fresh fruits and vegetables, you'll need a guide that lists carbohydrate in these foods. You may use an alex on your smart phone called [employer]. How can you plan healthy meals? Here are some tips to get started:  Plan your meals a week at a time. Don't forget to include snacks too. Use cookbooks or online recipes to plan several main meals. Plan some quick meals for busy nights. You also can double some recipes that freeze well. Then you can save half for other busy nights when you don't have time to cook. Make sure you have the ingredients you need for your recipes.  If you're running low on basic items, put these items on your shopping list too. List foods that you use to make breakfasts, lunches, and snacks. List plenty of fruits and vegetables. Post this list on the refrigerator. Add to it as you think of more things you need. Take the list to the store to do your weekly shopping. Follow-up care is a key part of your treatment and safety. Be sure to make and go to all appointments, and call your doctor if you are having problems. It's also a good idea to know your test results and keep a list of the medicines you take.    --------------------------------------------------------------------------------------------------------------------------------------------------------------------------------  Diabetes Dental Care  When you have diabetes, managing blood sugar levels and taking good care of your teeth and gums are both important. When blood sugar levels are high, there's a greater risk for Gum (periodontal) disease. Tooth decay. Fungal infections in the mouth, like thrush. Dry mouth. Keeping your blood sugar levels in your target range can help prevent problems with the teeth and gums. If you have any problems with your teeth or gums, it is important see your dentist.  How do you care for your teeth and gums when you have diabetes? Brush your teeth twice a day. Floss daily. Make sure to press the floss against your teeth and not your gums. Check each day for areas where your gums might be red or painful. Be sure to let your dentist know of any sores in your mouth. See your dentist regularly for professional cleaning of your teeth and to look for gum problems. Many dentists recommend getting checkups twice a year. Remind your dentist that you have diabetes before any work is done.   Don't smoke or use smokeless tobacco.    --------------------------------------------------------------------------------------------------------------------------------------------------------------------------------  Diabetes Foot Care    When you have diabetes, your feet need extra care and attention. Diabetes can damage the nerve endings and blood vessels in your feet, making you less likely to notice when your feet are injured. Diabetes also limits your body's ability to fight infection. If you get a minor foot injury, it could become an ulcer or a serious infection. With good foot care, you can prevent most of these problems. Caring for your feet can be quick and easy. Most of the care can be done when you are bathing or getting ready for bed. Keep your blood sugar close to normal by watching what and how much you eat, monitoring blood sugar, taking medicines if prescribed, and getting regular exercise. Do not smoke. Smoking affects blood flow and can make foot problems worse. Eat a diet that is low in fats. High fat intake can cause fat to build up in your blood vessels and decrease blood flow. Inspect your feet daily for blisters, cuts, cracks, or sores. If you cannot see well, use a mirror or have someone help you. Take care of your feet:  Wash your feet every day. Use warm (not hot) water. Check the water temperature with your wrists or other part of your body, not your feet. Dry your feet well. If the skin on your feet stays moist, bacteria or a fungus can grow, which can lead to infection. Use moisturizing skin cream to keep the skin on your feet soft and prevent calluses and cracks. Stop using any cream that causes a rash. Clean underneath your toenails carefully. Do not use a sharp object to clean underneath your toenails. Change socks daily. Look inside your shoes every day for things like gravel or torn linings, which could cause blisters or sores. Buy shoes that fit well but not too tightly to prevent bunions and blisters. Shoes should be flexible and breathable but prevent from injury. Do not go barefoot, especially at night, to prevent injury. Do not try to treat an early foot problem at home.  Home remedies or treatments that you can buy without a prescription (such as corn removers) can be harmful. Seek immediate help if:   You have a foot sore, an ulcer or break in the skin that is not healing after 4 days, bleeding corns or calluses, or an ingrown toenail. You have blue or black areas. You have peeling skin or tiny blisters between your toes or cracking or oozing of the skin. You have a fever for more than 24 hours and a foot sore. You have new numbness or tingling in your feet that does not go away after you move your feet or change positions. You have new unexplained or unusual swelling of the foot or ankle.

## 2023-01-31 NOTE — PROGRESS NOTES
[facility] DIABETES AND ENDOCRINOLOGY  DR ROMEO FRANCOIS     Ludwig Burks  was evaluated through a synchronous (real-time) audio-video encounter. The patient (or guardian if applicable) is aware that this is a billable service, which includes applicable co-pays. Verbal consent to proceed has been obtained. The visit was conducted pursuant to the emergency declaration under the [address], [facility] authority and the [employer] General Act. Patient identification was verified, and a caregiver was present when appropriate. The patient was located at home in a state where the provider was licensed to provide care. REFERRED BY: Hailey Mccartney MD     REASON:  Uncontrolled type 1 diabetes    CHIEF COMPLAINT: Blood glucose is high    HISTORY OF PRESENT ILLNESS:   Ludwig Burks is a 32 y.o. female with a PMHx as noted below who presents for evaluation of uncontrolled type 1 diabetes. Was seeing Endo in [place], has not seen someone since moving to [place]    Diabetes History:  Diabetes was diagnosed 2010, 15 yrs old  Family History of diabetes is maternal grandparent DM1, brother DM1, paternal grandfather DM2  Hb A1c :  13.3% 01/22/2023,  13.0%  10/14/2022    Regimen at time of establishing care includes:  -Tresiba 46 - 48 units bedtime  -Novolog correction ,:10 above 120; average 20 units per meal    Was on Medtronic 670G 5 years ago, was on it for 4-5 years    Review of home glucose:   Has Dexcom G6 for past 2 years  AM   Lunch before 180-210  Dinnerbefore 180-210  Bedtime 300s    Hypoglycemia:yes - few times at the weekend lowest 55, has hypoglycemia unawareness    Diet:  -2 meals  Breakfast on weekend only  -lunch: [employer], subway, sonic, pizza, fast food, fries and tater tots  -dinner: cooks at home, chicken, salmon, pork chops, eats pasta or rice  -snacks: popcorn, potato chips, fruit snacks  Theresa: coffee, soda regular 12 oz bottle every night    Physical Activity:  -Recently has new job that is more stressful  Has new dog and walking him 3 times per day    Complications:  Retinopathy:Yes, early b/l   Last Ophthalm:10/2022  Nephropathy:Yes  Neuropathy:Yes  Last Podiatry:none   Amputations:No   MI or CVA:No  Gastropathy:No       On a Statin:No  On an ACEI/ARB:No  On Aspirin:No  Smoker:No    Comprehensive Diabetes Education: when diagnosed and following since May 2022    Review of most recent diabetes-related labs:  Lab Results   Component Value Date    HBA1C 13.3 (H) 01/22/2023    HBA1C 13.0 (H) 10/14/2022    CHOL 162 10/14/2022    LDLC 61.4 10/14/2022    MCACR 7 10/14/2022    TSH 1.06 10/14/2022     Lab Key:  [id number] = IA-2 pancreatic islet cell autoantibody  CPEPL = C-peptide level  :EXT = External Lab  GADLT = KENNEDY-65 autoantibody   INSUL = Insulin level  MCACR (or MALBEXT) = Urine Microalbumin (or External UM)  B12LT = B12 level    PAST MEDICAL/SURGICAL HISTORY:   Past Medical History:   Diagnosis Date    Diabetes ([facility] [address]      Past Surgical History:   Procedure Laterality Date    HX CHOLECYSTECTOMY         ALLERGIES:   Allergies   Allergen Reactions    Lisinopril Swelling       MEDICATIONS ON ADMISSION:     Current Outpatient Medications:     NovoLOG Flexpen U-100 Insulin 100 unit/mL (3 mL) inpn, INJECT UP TO 60 UNITS DAILY AS DIRECTED BY PHYSICIAN, Disp: , Rfl:     Tresiba FlexTouch U-200 200 unit/mL (3 mL) inpn pen, INJECT 50 UNITS UNDER THE SKIN EVERY MORNING, Disp: , Rfl:     medroxyPROGESTERone (DEPO-PROVERA) 150 mg/mL syrg, ADMINISTER 1 ML IN THE MUSCLE 1 TIME EVERY 12 WEEKS, Disp: , Rfl:     Dexcom G6 Sensor wagner, , Disp: , Rfl:     Dexcom G6 Transmitter wagner, USE ONE BOX EVERY 3 MONTHS., Disp: , Rfl:     dicyclomine (BENTYL) 20 mg tablet, Take 1 Tablet by mouth every six (6) hours.  (Patient not taking: Reported on 1/31/2023), Disp: 20 Tablet, Rfl: 0    lidocaine (Lidoderm) 5 %, Apply patch to the affected area for 12 hours a day and remove for 12 hours a day. (Patient not taking: Reported on 1/31/2023), Disp: 5 Each, Rfl: 1    SOCIAL HISTORY:   Social History     Socioeconomic History    Marital status: SINGLE     Spouse name: Not on file    Number of children: Not on file    Years of education: Not on file    Highest education level: Not on file   Occupational History    Not on file   Tobacco Use    Smoking status: Never    Smokeless tobacco: Never   Substance and Sexual Activity    Alcohol use: Not Currently    Drug use: Never    Sexual activity: Not on file   Other Topics Concern    Not on file   Social History Narrative    Not on file     Social Determinants of Health     Financial Resource Strain: Not on file   Food Insecurity: Not on file   Transportation Needs: Not on file   Physical Activity: Not on file   Stress: Not on file   Social Connections: Not on file   Intimate Partner Violence: Not on file   Housing Stability: Not on file       FAMILY HISTORY:  No family history on file. REVIEW OF SYSTEMS: Complete ROS assessed and noted for that which is described above, all else are negative. Eyes: normal  ENT: normal  CVS: normal  Resp: normal  GI: normal  : normal  GYN: normal  Endocrine: normal  Integument: normal  Musculoskeletal: normal  Neuro: normal  Psych: normal      PHYSICAL EXAMINATION:  Telemedicine Visit    GENERAL: NCAT, Appears well nourished  EYES: EOMI, non-icteric, no proptosis  Ear/Nose/Throat: NCAT, no visible inflammation or masses  CARDIOVASCULAR: no cyanosis, no visible JVD  RESPIRATORY: comfortable respirations observed, no cyanosis  MUSCULOSKELETAL: Normal ROM of upper extremities observed  SKIN: No edema, rash, or other significant changes observed  NEUROLOGIC:  AAOx3  PSYCHIATRIC: Normal affect, Normal insight and judgement       REVIEW OF LABORATORY AND RADIOLOGY DATA:   Labs and documentation have been reviewed as described above. ASSESSMENT AND PLAN:   Marky Marie is a 32 y.o. female with a PMHx as noted above who presents for evaluation of uncontrolled type 1 diabetes. Problems:  Type 1 diabetes Uncontrolled    We had the pleasure of reviewing together the basics of diabetes including basic pathophysiology and diabetes care. We further discussed the importance of checking home glucose regularly and takin all of their scheduled medications in order to have the best possible outcome. I was able to answer any questions they had in clinic today and they are invited to reach me if they have any further questions. Based upon our discussion together today we have decided to make the following changes: We spent time today discussing preferred dietary changes and goals which will benefit their diabetes treatment. We noted the need to have an awareness of the amount of carbohydrates consumed in each meal, which includes the beverage, main course, and desert. We noted the benefits of eating 3 meals per day with appropriate portions. We also discussed the importance of getting blood sugars back down in a timely fashion following meals to reduce what is known as post-prandial hyperglycemia. Patient demonstrated their understanding of these concepts.       She is interested in DM technology and wants tubeless pump Omnipod 5 to sync with her Dexcom G6  Advised to cut back on dinner time soda    BS targets :  AM   Rest of the day 200    PLAN  Type 1 Diabetes  A1c goal:7%    Medications:   Kayleigh Barahona cut back to 44 units  Novolog 20 units breakfast and lunch and take more for dinner by 2 to 3 units if drinking soda, she can cut back if not drinking soda or smaller portion  She will send the Dexcom pdf to me by email  She will message me through [employer] company for Dexcom supplies    Advised to check glucose 4-5x/day by CGM  Referred for DM education  UTD with Ophthalm  UTD with labs  Regular foot self checks    I am recommending a CMG system for this patient, and they meet the following criteria:   1. Patient is diabetic and is on insulin   2. Patient is either on a pump or MDI (documenting 3 or more shots per day)  3. Patient is testing 4 or more times per day which has been documented  4. Patient makes frequent self-adjustments to their insulin regimen  5. I recommend a CGM for this patient     BP: telehealth appt  HLD: Fasting lipids /reviewed, well controlled off statin  Lab Results   Component Value Date/Time    Cholesterol, total 162 10/14/2022 03:19 PM    HDL Cholesterol 55 10/14/2022 03:19 PM    LDL, calculated 61.4 10/14/2022 03:19 PM    VLDL, calculated 45.6 10/14/2022 03:19 PM    Triglyceride 228 (H) 10/14/2022 03:19 PM    CHOL/HDL Ratio 2.9 10/14/2022 03:19 PM      RTC 6 weeks in person    We discussed the expected course, resolution and complications of the diagnosis(es) in detail. Medication risks, benefits, costs, interactions, and alternatives were discussed as indicated. I advised Norm Rojas to contact the office if her condition worsens, changes or fails to improve as anticipated. Patient expressed understanding with the diagnosis(es) and plan. Please note that this dictation was completed with [employer], the computer voice recognition software. Quite often unanticipated grammatical, syntax, homophones, and other interpretive errors are inadvertently transcribed by the computer software. Efforts were made to correct these errors in proofreading. Please excuse any errors that have escaped final proofreading. Thank you. MD Yan[facility] Diabetes [facility] Endocrinology    Please see patient instructions.

## 2023-03-14 ENCOUNTER — OFFICE VISIT (OUTPATIENT)
Dept: ENDOCRINOLOGY | Age: 28
End: 2023-03-14
Payer: COMMERCIAL

## 2023-03-14 VITALS
HEART RATE: 92 BPM | DIASTOLIC BLOOD PRESSURE: 74 MMHG | HEIGHT: 67 IN | SYSTOLIC BLOOD PRESSURE: 110 MMHG | WEIGHT: 230.2 LBS | BODY MASS INDEX: 36.13 KG/M2

## 2023-03-14 DIAGNOSIS — E10.65 TYPE 1 DIABETES MELLITUS WITH HYPERGLYCEMIA (HCC): Primary | ICD-10-CM

## 2023-03-14 DIAGNOSIS — E10.649 HYPOGLYCEMIA UNAWARENESS ASSOCIATED WITH TYPE 1 DIABETES MELLITUS (HCC): ICD-10-CM

## 2023-03-14 DIAGNOSIS — E66.09 CLASS 2 OBESITY DUE TO EXCESS CALORIES WITH BODY MASS INDEX (BMI) OF 36.0 TO 36.9 IN ADULT, UNSPECIFIED WHETHER SERIOUS COMORBIDITY PRESENT: ICD-10-CM

## 2023-03-14 PROCEDURE — 99214 OFFICE O/P EST MOD 30 MIN: CPT | Performed by: GENERAL ACUTE CARE HOSPITAL

## 2023-03-14 PROCEDURE — 3046F HEMOGLOBIN A1C LEVEL >9.0%: CPT | Performed by: GENERAL ACUTE CARE HOSPITAL

## 2023-03-14 RX ORDER — INSULIN DETEMIR 100 [IU]/ML
INJECTION, SOLUTION SUBCUTANEOUS
Qty: 60 ML | Refills: 3 | Status: SHIPPED | OUTPATIENT
Start: 2023-03-14 | End: 2023-03-16 | Stop reason: SDUPTHER

## 2023-03-14 RX ORDER — PEN NEEDLE, DIABETIC 32GX 5/32"
NEEDLE, DISPOSABLE MISCELLANEOUS
Qty: 200 PEN NEEDLE | Refills: 5 | Status: SHIPPED | OUTPATIENT
Start: 2023-03-14

## 2023-03-14 NOTE — LETTER
3/15/2023    Patient: Giovanna Robles   YOB: 1995   Date of Visit: 3/14/2023     Tracie Monson MD  [address] [place] [address]  Via In [facility] [address]    Dear Tracie Monson MD,      Thank you for referring Ms. Giovanna Robles to [address] [place] [address] for evaluation. My notes for this consultation are attached. If you have questions, please do not hesitate to call me. I look forward to following your patient along with you.       Sincerely,    Shantelle Denny MD

## 2023-03-14 NOTE — PATIENT INSTRUCTIONS
BS targets :  AM   Rest of the day 200    Take:  Stop [employer], take 24 units every 12 hours  Novolog 16 units and sliding scale  TAKE 10 MINS BEFORE THE MEAL  Correction Scale:   1 unit for every 40 above 150    IF GLUCOSE IS:                 THEN TAKE:      0   Extra Unit  151-190   1   Extra Unit  191-230   2   Extra Units  231-270   3   Extra Units  271-310   4   Extra Units  311-350   5   Extra Units  >350    6   Extra Units    Example: My planned insulin dose:    ____ Units    +    ____ Extra Correction Units  =  ____ total units to take together as one injection. PLEASE SEE IF TANDEM PUMP T-SLIM x2    Tandem T-slim 2 Insulin Pump:  Please contact Tandem Representative ms. Grupo Lopez to get set up with the insulin pump:  Mobile: [phone]  Fax: [phone]

## 2023-03-14 NOTE — PROGRESS NOTES
[facility]  [facility]     REFERRED BY: Leona Walter MD     REASON:  Uncontrolled type 1 diabetes    CHIEF COMPLAINT: Blood glucose is high    HISTORY OF PRESENT ILLNESS:   Giorgio Mo is a 29 y.o. female with a PMHx as noted below who presents for evaluation of uncontrolled type 1 diabetes. Was seeing Endo in [place], has not seen someone since moving to [place]    In the last visit we adjusted ms Vazquez's to receive Tresiba 46 to 44 and increase her NovoLog to 20 units plus sliding scale, she did not send the Dexcom to visit, we were able to get a copy of today. She continues to very high calcium drops very low. Indicates that she was taking her insulin AFTER meals contributing to her hypoglycemia after meals. She continues to drink regular soda with dinner. Diabetes History:  Diabetes was diagnosed 2010, 15 yrs old  Family History of diabetes is maternal grandparent DM1, brother DM1, paternal grandfather DM2  Hb A1c :  13.3% 01/22/2023,  13.0%  10/14/2022    Regimen at time of establishing care includes:  -Tresiba 44 units  - Novolog 20 units + SS 1:40 above 150    Was on Medtronic 670G 5 years ago, was on it for 4-5 years    Review of home glucose:   Has Dexcom G6 for past 2 years  See full report scanned        Hypoglycemia:yes - few times at the weekend lowest 55, has hypoglycemia unawareness    Diet:  -2 meals  Breakfast on weekend only  -lunch: [place] mat[place] A, subway, sonic, pizza, fast food, fries and tater tots  -dinner: cooks at home, chicken, salmon, pork chops, eats pasta or rice  -snacks: popcorn, potato chips, fruit snacks  Theresa: coffee, soda regular 12 oz bottle every night    Physical Activity:  -Recently has new job that is more stressful  Has new dog and walking him 3 times per day    Complications:  Retinopathy:Yes, early b/l  Last Ophthalm:10/2022  Nephropathy:Yes  Neuropathy:Yes  Last Podiatry:none   Amputations:No   MI or CVA:No  Gastropathy:No On a Statin:No  On an ACEI/ARB:No  On Aspirin:No  Smoker:No    Comprehensive Diabetes Education: when diagnosed and following since May 2022    Review of most recent diabetes-related labs:  Lab Results   Component Value Date    HBA1C 13.3 (H) 01/22/2023    HBA1C 13.0 (H) 10/14/2022    CHOL 162 10/14/2022    LDLC 61.4 10/14/2022    MCACR 7 10/14/2022    TSH 1.06 10/14/2022     Lab Key:  [id number] = IA-2 pancreatic islet cell autoantibody  CPEPL = C-peptide level  :EXT = External Lab  GADLT = KENNEDY-65 autoantibody   INSUL = Insulin level  MCACR (or MALBEXT) = Urine Microalbumin (or External UM)  B12LT = B12 level    PAST MEDICAL/SURGICAL HISTORY:   Past Medical History:   Diagnosis Date    Diabetes ([facility] [address]      Past Surgical History:   Procedure Laterality Date    HX CHOLECYSTECTOMY         ALLERGIES:   Allergies   Allergen Reactions    Lisinopril Swelling       MEDICATIONS ON ADMISSION:     Current Outpatient Medications:     Omnipod 5 G6 Pods, Gen 5, crtg, 10 Each by SubCUTAneous route every seventy-two (72) hours. E10.65, Disp: 10 Each, Rfl: 12    NovoLOG Flexpen U-100 Insulin 100 unit/mL (3 mL) inpn, INJECT UP TO 60 UNITS DAILY AS DIRECTED BY PHYSICIAN, Disp: , Rfl:     Tresiba FlexTouch U-200 200 unit/mL (3 mL) inpn pen, INJECT 50 UNITS UNDER THE SKIN EVERY MORNING, Disp: , Rfl:     medroxyPROGESTERone (DEPO-PROVERA) 150 mg/mL syrg, ADMINISTER 1 ML IN THE MUSCLE 1 TIME EVERY 12 WEEKS, Disp: , Rfl:     Dexcom G6 Sensor wagner, , Disp: , Rfl:     Omnipod 5 G6 Intro Kit, Gen 5, crtg, 1 Kit by SubCUTAneous route every seventy-two (72) hours. E10.65 (Patient not taking: Reported on 3/14/2023), Disp: 1 Kit, Rfl: 0    dicyclomine (BENTYL) 20 mg tablet, Take 1 Tablet by mouth every six (6) hours. (Patient not taking: No sig reported), Disp: 20 Tablet, Rfl: 0    lidocaine (Lidoderm) 5 %, Apply patch to the affected area for 12 hours a day and remove for 12 hours a day.  (Patient not taking: No sig reported), Disp: 5 Each, Rfl: 1    Dexcom G6 Transmitter wagner, USE ONE BOX EVERY 3 MONTHS., Disp: , Rfl:     SOCIAL HISTORY:   Social History     Socioeconomic History    Marital status: SINGLE     Spouse name: Not on file    Number of children: Not on file    Years of education: Not on file    Highest education level: Not on file   Occupational History    Not on file   Tobacco Use    Smoking status: Never    Smokeless tobacco: Never   Substance and Sexual Activity    Alcohol use: Not Currently    Drug use: Never    Sexual activity: Not on file   Other Topics Concern    Not on file   Social History Narrative    Not on file     Social Determinants of Health     Financial Resource Strain: Not on file   Food Insecurity: Not on file   Transportation Needs: Not on file   Physical Activity: Not on file   Stress: Not on file   Social Connections: Not on file   Intimate Partner Violence: Not on file   Housing Stability: Not on file       FAMILY HISTORY:  No family history on file. REVIEW OF SYSTEMS: Complete ROS assessed and noted for that which is described above, all else are negative. Eyes: normal  ENT: normal  CVS: normal  Resp: normal  GI: normal  : normal  GYN: normal  Endocrine: normal  Integument: normal  Musculoskeletal: normal  Neuro: normal  Psych: normal      PHYSICAL EXAMINATION:  Telemedicine Visit    GENERAL: NCAT, Appears well nourished  EYES: EOMI, non-icteric, no proptosis  Ear/Nose/Throat: NCAT, no visible inflammation or masses  CARDIOVASCULAR: no cyanosis, no visible JVD  RESPIRATORY: comfortable respirations observed, no cyanosis  MUSCULOSKELETAL: Normal ROM of upper extremities observed  SKIN: No edema, rash, or other significant changes observed  NEUROLOGIC:  AAOx3  PSYCHIATRIC: Normal affect, Normal insight and judgement       REVIEW OF LABORATORY AND RADIOLOGY DATA:   Labs and documentation have been reviewed as described above.      ASSESSMENT AND PLAN:   Moira Soto is a 29 y.o. female with a PMHx as noted above who presents for evaluation of uncontrolled type 1 diabetes. Problems:  Type 1 diabetes Uncontrolled    The biggest issue with blood sugar control remains as the dietary noncompliance and also taking NovoLog after meals instead of before and therefore having hypoglycemia episodes and fluctuations between extremely high blood sugars. We spent time today discussing preferred dietary changes and goals which will benefit their diabetes treatment. We noted the need to have an awareness of the amount of carbohydrates consumed in each meal, which includes the beverage, main course, and desert. We noted the benefits of eating 3 meals per day with appropriate portions. We also discussed the importance of getting blood sugars back down in a timely fashion following meals to reduce what is known as post-prandial hyperglycemia. Patient demonstrated their understanding of these concepts. She is interested in DM technology and wants insulin pump to sync with her Dexcom G6, she thinking about using Tandem T-slim 2    Advised to cut back on dinner time soda and lunch time fast foods    BS targets for now:  AM   Rest of the day 200    PLAN  Type 1 Diabetes  A1c goal:7%    Medications:     Stop Noel Pluck (she is having morning hypoglycemia likely due to prolonged effect of degludec, will change to detemir]    Start Levemir, take 24 units every 12 hours  Novolog 16 units and sliding scale  TAKE 10 MINS BEFORE THE MEAL  Correction Scale:   1 unit for every 40 above 150    Advised to check glucose 4-5x/day by CGM  Referred for DM education has upcoming appointment with ms. Tesha Thomas  UTD with Ophthalm  UTD with labs  Regular foot self checks    BP: Well-controlled today, no changes  HLD: Fasting lipids /reviewed, well controlled off statin  Obesity BMI 36: discussed lifestyle modif, will monitor  Lab Results   Component Value Date/Time    Cholesterol, total 162 10/14/2022 03:19 PM    HDL Cholesterol 55 10/14/2022 03:19 PM LDL, calculated 61.4 10/14/2022 03:19 PM    VLDL, calculated 45.6 10/14/2022 03:19 PM    Triglyceride 228 (H) 10/14/2022 03:19 PM    CHOL/HDL Ratio 2.9 10/14/2022 03:19 PM      RTC 8 weeks    We discussed the expected course, resolution and complications of the diagnosis(es) in detail. Medication risks, benefits, costs, interactions, and alternatives were discussed as indicated. I advised Giorgio Mo to contact the office if her condition worsens, changes or fails to improve as anticipated. Patient expressed understanding with the diagnosis(es) and plan. Please note that this dictation was completed with [employer], the computer voice recognition software. Quite often unanticipated grammatical, syntax, homophones, and other interpretive errors are inadvertently transcribed by the computer software. Efforts were made to correct these errors in proofreading. Please excuse any errors that have escaped final proofreading. Thank you. Carla Calero MD   [facility]    Please see patient instructions.

## 2023-03-16 ENCOUNTER — TELEPHONE (OUTPATIENT)
Dept: ENDOCRINOLOGY | Age: 28
End: 2023-03-16

## 2023-03-16 NOTE — TELEPHONE ENCOUNTER
Patient left a message and stated that the pharmacy informed her that they do not have a script for the levemir. She is requesting that it be sent to them. I called and left her a message to call me in regards to the chart showing that it was sent in two day ago. Patient has never called back so I will forward this message to Dr. Arnoldo Stover to resend it.

## 2023-03-17 RX ORDER — INSULIN DETEMIR 100 [IU]/ML
INJECTION, SOLUTION SUBCUTANEOUS
Qty: 60 ML | Refills: 3 | Status: SHIPPED | OUTPATIENT
Start: 2023-03-17

## 2023-03-21 ENCOUNTER — TELEPHONE (OUTPATIENT)
Dept: ENDOCRINOLOGY | Age: 28
End: 2023-03-21

## 2023-03-24 ENCOUNTER — CLINICAL SUPPORT (OUTPATIENT)
Dept: DIABETES SERVICES | Age: 28
End: 2023-03-24

## 2023-03-24 DIAGNOSIS — E10.65 TYPE 1 DIABETES MELLITUS WITH HYPERGLYCEMIA (HCC): Primary | ICD-10-CM

## 2023-03-24 RX ORDER — INSULIN GLARGINE 100 [IU]/ML
INJECTION, SOLUTION SUBCUTANEOUS
Qty: 45 ML | Refills: 3 | Status: SHIPPED | OUTPATIENT
Start: 2023-03-24

## 2023-03-24 NOTE — PROGRESS NOTES
[employer] [facility]  Diabetes Self-Management Education & Support Program    Reason for Referral: Type 1 w/ hyperglycemia  Referral Source: Be Kuhn MD  Services requested: DSMES       ASSESSMENT    From my perspective, the participant would benefit from [facility] specifically related to healthy eating, monitoring, taking medications, healthy coping, and problem solving. Will complete the Diabetes Skills, Confidence, and Preparedness Index at next visit. During the program, we will focus on providing DSMES that specifically addresses participant's interest in reducing risks, healthy eating, monitoring, taking medications, physical activity, healthy coping, and problem solving, as shown by their reported readiness to change. The participant would be best served by attending weekly individual sessions - needs carb counting and skills to restart insulin pump therapy per pt and . Diabetes Self-Management Education Follow-up Visit: 4/20/23       Clinical Presentation  Elizabeth Mccrary is a 29 y.o.  female referred for diabetes self-management education. Participant has Type 1 DM for 11-20 years. Family history positivefor diabetes. Patient reports receiving DSMES services in the past.     Most recent A1c value:   Lab Results   Component Value Date/Time    Hemoglobin A1c 13.3 (H) 01/22/2023 12:42 PM       Diabetes-related medical history: NONE        Diabetes-related medications:  Current dosing:   Key Antihyperglycemic Medications               Levemir FlexPen 100 unit/mL (3 mL) inpn Use 24 units every 12 hours    NovoLOG Flexpen U-100 Insulin 100 unit/mL (3 mL) inpn INJECT UP TO 60 UNITS DAILY AS DIRECTED BY PHYSICIAN            Blood Pressure Management  Key ACE/ARB Medications       Patient is on no ACE or ARB meds. Lipid Management  Key Antihyperlipidemia Meds       The patient is on no antihyperlipidemia meds.             Clot Prevention  Key Anti-Platelet Anticoagulant Meds       The patient is on no antiplatelet meds or anticoagulants. Learning Assessment  Learning objectives Educator assessment (3/24/2023)   Diabetes Disease Process  The participant can   A) describe diabetes in basic terms;   B) state the type of diabetes they have; &   C) state accepted blood glucose targets. Healthy Eating  The participant can   A) identify carbohydrate foods; &   B) accurately read food labels. Being Active  The participant can  A) state the benefits of physical activity;  B) report their current PA practices;  C) identify PA they would consider incorporating in their lives; &  D) develop an implementation plan. Monitoring  The participant can  A) operate their blood glucose meter; &  B) describe how they log their blood glucoses to share with their provider. Taking Medications  The participant can  A) name their diabetes medications;  B) state the purpose and dose;  C) note side effects; &  D) describe proper storage, disposal & transport (if appropriate). Healthy Coping  The participant can    A) describe their response to diabetes diagnosis; B) describe their specific coping mechanisms;  C) identify supportive people and/or other resources that positively support their diabetes self-care and health. Reducing Risks  The participant can describe the preventive measures used by providers to promote health and prevent diabetes complications. Problem Solving  The participant can   A) identify signs, symptoms & treatment of hypoglycemia;    B) identify signs, symptoms & treatment of hyperglycemia;  C) describe their sick day plan; &  D) identify BG patterns to discuss with their provider.        Yes  Yes  Yes        Yes  Yes        Yes  Yes  Yes  Yes        Yes  Yes        Yes  Yes  Yes  Yes        Yes  No  Yes        Yes          No  Yes  No  No     Characteristics to Learning   Barriers to Learning      None     Favorite Ways to Learn   [] Lecture  [] Slides  [] Reading [] Video-Internet  [] Cassettes/CDs/MP3's  [] Interactive Small Groups [x] Other- observation and hands on       Behavioral Assessment  Current self-care practices  Educator assessment (3/24/2023)   Healthy Eating   Current practices    24-hour Dietary Recall:  Breakfast: premier protein shake/ Pure protein bar/Power Crunch. Cinnamon Raisin bagel and cream cheese. Lunch: leftovers. Dinner: Chicken or salmon, rice 2 cups or pasta broccoli/summer squash. Snacks: gummies: 5-6. Fruit pre- portions. Beverages: Coffee with almond cream and caramel sauce. Selter water and [employer]. 40-64oz water consumed daily. Alcohol: none       Would benefit from [facility] related to Healthy Eating: Yes    Eats a carbohydrate controlled diet: No    Stage of change: Action   She shared that she is paying more attention to her carbohydrates but is guessing. Reports avoiding dining out and using Door Dash and preparing foods at home- limiting to dining out once weekly. She has changed her beverage choices. We downloaded the [employer] [address] alex for her use for estimating her carbs. She will measure the components of her coffee and bring with her to next visit - will complete count together. She is currently using a set insulin dose for her meals (chases with gummie bears to fill gaps). We are going to get her carb counting and using ratios for dosing at meals prior to starting pumps. Being Active  Current practices  How many days during the past week have you performed physical activity where your heart beats faster and your breathing is harder than normal for 30 minutes or more? 4 day(s)    How many days in a typical week do you perform activity such as this?  7 day(s)     Would benefit from [facility] related to Being Active: Yes}      Exercises 150 minutes/week: Yes - has started walking the dog bid 30 minutes.       Stage of change: Action     Monitoring  Current practices  Do you monitor your blood sugar? Yes    How often do you monitor? >5x/day    What are the range of readings? Breakfast:  mg/dL   Lunch: 200-250 mg/dL   Dinner: 200-250 mg/dL    Do you know your last A1c measurement? Yes    Do you know the meaning of the A1c? Yes     Would benefit from [facility] related to Monitoring: No      Uses BG readings to establish trends and understand BG patterns: Yes      Stage of change: Action       Taking Medication  Current practices  Do you understand what your diabetes medications do? Yes    How often do you miss doses of your diabetes medications? Insulin injections are late with lunch and dinner. Can you afford your diabetes medications? Yes   Would benefit from [facility] related to Taking Medication: Yes      Takes medications consistently to receive full benefit: No      Stage of change: Action       Healthy Coping   Current state  Diabetes Skills, Confidence and Preparedness Index: to be completed     Would benefit from [facility] related to Healthy Coping: Yes      Identifies specific people, organizations,etc, that actively support their diabetes self-care efforts: No      Stage of change: Action     Reducing Risks  Current state  Vaccines:  Influenza:   Immunization History   Administered Date(s) Administered    Influenza, FLUARIX, FLULAVAL, FLUZONE (age 10 mo+) AND AFLURIA, (age 1 y+), PF, 0.5mL 10/14/2022       Pneumococcal: There is no immunization history for the selected administration types on file for this patient. Hepatitis: There is no immunization history for the selected administration types on file for this patient.     Examinations:  Diabetic Foot and Eye Exam HM Status   Topic Date Due    Diabetic Foot Care  Never done        Dental exam: last appointment was: 9/2022  Eye exam: 10/2022  Foot exam: due    Heart Protection:  BP Readings from Last 2 Encounters:   03/14/23 110/74   01/22/23 (!) 144/84        Lab Results   Component Value Date/Time    LDL, calculated 61.4 10/14/2022 03:19 PM Kidney Protection:  Lab Results   Component Value Date/Time    Microalbumin/Creat ratio (mg/g creat) 7 10/14/2022 03:19 PM    Microalbumin,urine random 1.01 10/14/2022 03:19 PM        Would benefit from [facility] related to Reducing Risks: No      Actively participates in decision-making with provider regarding secondary prevention:  Yes      Stage of change: Maintenance    Problem Solving  Current state  Hypoglycemia Management:  What are signs and symptoms of hypoglycemia that you experience: Shaking/trembling, Irritable. How do you prevent hypoglycemia: consistent meals/snack time    How do you treat hypoglycemia: Rule of 15 and snacks or fruit snacks to prevent BG from dropping <80 mg/dl     Hyperglycemia Management:  What are signs and symptoms of hyperglycemia that you experience Pt reported being unaware of s/s of hyperglycemia    How can you prevent hyperglycemia: take medications as instructed, focus on carbohydrate counting/meal planning, engage in regular physical activity, monitor blood sugar, and manage my stress level. Sick Day Management:  What do you do differently on sick days: Take diabetes medication as instructed by provider    Pattern Management:  Do you notice blood glucose patterns when you look at the readings in your meter or logbook?  Yes    How do you use the blood glucose readings from your meter or logbook? adjust medications and/or insulin based on insulin     Would benefit from [facility] related to Problem Solving: Yes      Articulates appropriate strategies to address hypoglycemia, hyperglycemia, sick day care and BG pattern: Yes      Stage of change: Action       Note: Content derived from the American Association of Diabetes Educators' Diabetes Education Curriculum: A Guide to Successful Self-Management (3rd edition)      Stevie Hodgkins, RD, [facility] on 3/24/2023 at 10:57 AM    I have personally reviewed the health record, including provider notes, laboratory data and current medications before making these care and education recommendations. The time spent in this effort is included in the total time.   Total minutes: 60

## 2023-03-29 ENCOUNTER — DOCUMENTATION ONLY (OUTPATIENT)
Dept: ENDOCRINOLOGY | Age: 28
End: 2023-03-29

## 2023-04-17 DIAGNOSIS — E10.65 TYPE 1 DIABETES MELLITUS WITH HYPERGLYCEMIA (HCC): Primary | ICD-10-CM

## 2023-04-20 ENCOUNTER — CLINICAL SUPPORT (OUTPATIENT)
Dept: DIABETES SERVICES | Age: 28
End: 2023-04-20
Payer: COMMERCIAL

## 2023-04-20 DIAGNOSIS — E10.65 TYPE 1 DIABETES MELLITUS WITH HYPERGLYCEMIA (HCC): Primary | ICD-10-CM

## 2023-04-20 PROCEDURE — G0108 DIAB MANAGE TRN  PER INDIV: HCPCS | Performed by: DIETITIAN, REGISTERED

## 2023-04-25 NOTE — PROGRESS NOTES
[facility]  Diabetes Self-Management Education & Support Program  Encounter Note    SUMMARY  Diabetes self-care management training was completed related to healthy eating - carb counting for insulin pump therapy. EVALUATION:  Rubia Sosa shared that work has been taxing but anticipates improvement as semester closes. She confirmed missing meals, late meals and \"grabbing what she can\". Often feels that she has little to no time to prepare meals and is looking into using meal prep/planning company such as hello fresh/[employer] to help alleviate stress of meal preparations. Admits to not making good choices when she is rushed or stressed. Continues to work on decreasing her intakes of sweets/soda as we move to pump therapy. RECOMMENDATIONS:  1) continue to use Calorie Paula Sow   2) use your resources for counting carbohydrates   3) try using quick idea list and pre-portion on weekend when meal prepping. 4) concur use of mail order meal prep company for convenience and decreased time commitment. TOPICS DISCUSSED TODAY:  WHAT CAN I EAT? 61      Next provider visit is unknown. DATE DSMES TOPIC EVALUATION     4/20/23 WHAT CAN I EAT? General principles   Determining a healthy weight   Nutritional terms & tools   Healthy Plate method   Carbohydrate Counting   Reading food labels   Free apps         The participant   Uses Healthy Plate principles in constructing meals: Yes  Reads food labels in choosing acceptable foods: Yes    The participant needs to address practicing carb counting more consistently as she migrates to pump therapy. She and I reviewed resources - she is faithful with label reading but is not always attentive to portion and carb estimates. As we reviewed foods and I demonstrated using measuring tools, she became more aware of need to \"get back to measuring\". Discussed \"on the go\" food options that she can prepare on weekend with minimum time commitment.  Also reinforced preparing 1-2 meals on weekend for week day. Can improve stress and stigma of healthy eating using the mail order meal companies - often have nutrition information available w/ recipes. Curt Montenegro RD, [facility] on 4/25/2023 at 1:56 PM    I have personally reviewed the health record, including provider notes, laboratory data and current medications before making these care and education recommendations. The time spent in this effort is included in the total time.   Total minutes: 65

## 2023-05-18 ENCOUNTER — OFFICE VISIT (OUTPATIENT)
Age: 28
End: 2023-05-18

## 2023-05-18 DIAGNOSIS — E10.65 TYPE 1 DIABETES MELLITUS WITH HYPERGLYCEMIA (HCC): Primary | ICD-10-CM

## 2023-05-22 NOTE — PROGRESS NOTES
[employer] [facility]  Diabetes Self-Management Education & Support Program  Encounter Note      SUMMARY  Diabetes self-care management training was completed related to insulin pump start for Tandem t:slim x2 with Control IQ. EVALUATION:  Mona Vergara did well with managing the navigation of the pump screens. She was strong encouraged to rotate her infusion sets away from her routine injection areas. We reviewed bad habits and discuss using the extended bolus feature for when she drinks her coffee with \"carbs\". She will be following up with me for more education 6/26/23. RECOMMENDATIONS:  1) always make sure to test BG using meter if your sensor doesn't have trend arrow or number. 2) start decreasing your high alert on your CGM so you respond more timely   3) make sure to bolus for all carbs and consider pre-bolusing 5-10 minutes prior to meal if CG values are elevated. 4) turn on Exercise Activity for walking dog.  5) contact  if BG are not responding or remaining elevated within next 1-2 weeks. Next provider visit is scheduled for 5/30/23         DATE DSMES TOPIC EVALUATION     5/18/23 HOW CAN BLOOD GLUCOSE MONITORING HELP ME? Value of blood glucose monitoring   Realistic expectations   Differences between sensor and blood glucose values. Setting alerts on sensor for high/low/out of range  Using sensor glucose levels for treatment decisions. Using glucometer for treatment decisions   Use of sensor trend arrows when dosing insulin   Sensor sites on body and relative to infusion sets  Tips for improving adhesion    Downloading and using smartphone apps  Data sharing with provider        Mona Jai and I reviewed trend arrows along with her treatment of hypoglycemia accessing how her treatment is working to prevent over treatment. We had discussion about timely bolusing. Entered her appointment without her glucometer and her sensor was reading high.  We discussed that this is

## 2023-05-25 ENCOUNTER — PATIENT MESSAGE (OUTPATIENT)
Age: 28
End: 2023-05-25

## 2023-06-02 ENCOUNTER — TELEPHONE (OUTPATIENT)
Age: 28
End: 2023-06-02

## 2023-06-05 RX ORDER — PROCHLORPERAZINE 25 MG/1
SUPPOSITORY RECTAL
Qty: 1 EACH | Refills: 3 | Status: SHIPPED | OUTPATIENT
Start: 2023-06-05

## 2023-06-05 RX ORDER — PROCHLORPERAZINE 25 MG/1
SUPPOSITORY RECTAL
Qty: 3 EACH | Refills: 11 | Status: SHIPPED | OUTPATIENT
Start: 2023-06-05

## 2023-06-26 ENCOUNTER — NURSE ONLY (OUTPATIENT)
Age: 28
End: 2023-06-26
Payer: COMMERCIAL

## 2023-06-26 DIAGNOSIS — E10.65 TYPE 1 DIABETES MELLITUS WITH HYPERGLYCEMIA (HCC): Primary | ICD-10-CM

## 2023-06-26 PROCEDURE — G0108 DIAB MANAGE TRN  PER INDIV: HCPCS | Performed by: DIETITIAN, REGISTERED

## 2023-07-24 ENCOUNTER — NURSE ONLY (OUTPATIENT)
Age: 28
End: 2023-07-24
Payer: COMMERCIAL

## 2023-07-24 DIAGNOSIS — E10.65 TYPE 1 DIABETES MELLITUS WITH HYPERGLYCEMIA (HCC): Primary | ICD-10-CM

## 2023-07-24 PROCEDURE — G0108 DIAB MANAGE TRN  PER INDIV: HCPCS | Performed by: DIETITIAN, REGISTERED

## 2023-07-25 NOTE — PROGRESS NOTES
[facility]  Diabetes Self-Management Education & Support Program  Encounter Note      SUMMARY  Diabetes self-care management training was completed related to physical activity and healthy coping. he participant will return on August 17 to continue DSMES related to problem solving. The participant did not identify SMART Goal(s) and will practice knowledge and skills related to  insulin pump therapy and Type 1 DM  to improve diabetes self-management. EVALUATION:  Nae South shared that she has been consistently on her insulin pump - if \"I choose not to start a pod at night, I will take basal insulin and get right back on the pump\". Reports that she is feeling much better in morning - BG values are lower, have more energy. She shared that she is more timely with taking care of values that are elevated. Reports losing weight and had one day when \" my SG readings were 100% in range\" and confirms that she was very proud of her efforts and I reinforced. RECOMMENDATIONS:  1) continue to respond to alerts/alarms timely along with consistent wearing of pump. 2) remember to use Activity feature when walks are long - helps with decreasing hypoglycemia risk. 3) try chair yoga and stretching exercises to help with stress. TOPICS DISCUSSED TODAY:  HOW DOES PHYSICAL ACTIVITY AFFECT MY DIABETES? 30  HOW DO I FIND SUPPORT TO TACKLE THIS CONDITION? 30      Next provider visit is scheduled for 8/21/23       DATE DSMES TOPIC EVALUATION     7/24/23 HOW DOES PHYSICAL ACTIVITY AFFECT MY DIABETES?    Benefits of physical activity   Beginning a program of physical activity   Walking   Pedometers   Goal setting   Structured physical activity program   Aerobic activity   Resistance   Flexibility   Balance   Physical activity program progression   Safety issues   Barriers to physical activity   Facilitators of physical activity      Nae South reports increased activity with moving her apartment

## 2023-08-21 ENCOUNTER — OFFICE VISIT (OUTPATIENT)
Age: 28
End: 2023-08-21
Payer: COMMERCIAL

## 2023-08-21 VITALS
DIASTOLIC BLOOD PRESSURE: 76 MMHG | HEART RATE: 103 BPM | SYSTOLIC BLOOD PRESSURE: 118 MMHG | BODY MASS INDEX: 35.03 KG/M2 | WEIGHT: 223.2 LBS | HEIGHT: 67 IN

## 2023-08-21 DIAGNOSIS — E10.65 TYPE 1 DIABETES MELLITUS WITH HYPERGLYCEMIA (HCC): Primary | ICD-10-CM

## 2023-08-21 LAB — HBA1C MFR BLD: 9.9 %

## 2023-08-21 PROCEDURE — 3046F HEMOGLOBIN A1C LEVEL >9.0%: CPT | Performed by: GENERAL ACUTE CARE HOSPITAL

## 2023-08-21 PROCEDURE — 83036 HEMOGLOBIN GLYCOSYLATED A1C: CPT | Performed by: GENERAL ACUTE CARE HOSPITAL

## 2023-08-21 PROCEDURE — 99214 OFFICE O/P EST MOD 30 MIN: CPT | Performed by: GENERAL ACUTE CARE HOSPITAL

## 2023-08-21 RX ORDER — GLUCAGON INJECTION, SOLUTION 1 MG/.2ML
INJECTION, SOLUTION SUBCUTANEOUS
Qty: 0.4 ML | Refills: 1 | Status: SHIPPED | OUTPATIENT
Start: 2023-08-21

## 2023-08-21 NOTE — PROGRESS NOTES
=    [facility]   DR RALEIGH YOUNGBLOOD       REFERRED BY: Alma Medina MD       REASON:  Uncontrolled type 1 diabetes      CHIEF COMPLAINT: Blood glucose is high      HISTORY OF PRESENT ILLNESS:    Serafin Phelps is a 29 y.o. female with a PMHx as noted below who presents for evaluation of uncontrolled type 1 diabetes. Was seeing Endo in [place], has not seen someone since moving to [facility]    8/21/23    Have not seen ms Kathy Hicks since she has started using the tandem pump  His hemoglobin A1c is improved from 13.3% to 9.9%   Has been out of insulin pump supplies for 3 days, she wore it again today         03/14/23   In the last visit we adjusted ms Black's to receive Tresiba 46 to 44 and increase her NovoLog to 20 units plus sliding scale, she did not send the Dexcom to visit, we were able to get a copy of today. She continues to very high blood sugar that fluctuate  with rapid drops very low. Indicates that she was taking her insulin AFTER meals contributing to her hypoglycemia after meals. She continues to drink regular soda with dinner. Diabetes History:   Diabetes was diagnosed 2010, 15 yrs old   Family History of diabetes is maternal grandparent DM1, brother DM1, paternal grandfather DM2   Hb A1c :  13.3% 01/22/2023,  13.0%  10/14/2022  9.9% 8/21/23       Tandem T-slim 2x started 03/2023     Was on Medtronic 670G 5 years ago, was on it for 4-5 years      Review of home glucose:    Has Dexcom G6 for past 2 years   See full report scanned       Hypoglycemia:yes - few times at the weekend lowest 59, has hypoglycemia unawareness      Diet:   -2 meals   Breakfast on weekend only   -lunch: [place] tremaine A, subway, sonic, pizza, fast food, fries and tater tots   -dinner: cooks at home, chicken, salmon, pork chops, eats pasta or rice   -snacks: popcorn, potato chips, fruit snacks   Betsy: coffee, soda regular 12 oz bottle every night      Physical Activity:   -Recently has new job

## 2023-08-21 NOTE — PATIENT INSTRUCTIONS
eat a snack or meal to prevent recurrent low blood sugar. Make sure family, friends, and coworkers know the symptoms of low blood sugar and know how to get your sugar level up. If you were prescribed glucagon, always have it with you. Make sure friends and family know how to use it. When should you call for help? Call 911 anytime you think you may need emergency care. For example, call if:    You passed out (lost consciousness). You are confused or cannot think clearly. Your blood sugar is very high or very low. Watch closely for changes in your health, and be sure to contact your doctor if:    Your blood sugar stays outside the level your doctor set for you. You have any problems.

## 2023-09-20 ENCOUNTER — OFFICE VISIT (OUTPATIENT)
Age: 28
End: 2023-09-20

## 2023-09-20 DIAGNOSIS — E10.65 TYPE 1 DIABETES MELLITUS WITH HYPERGLYCEMIA (HCC): Primary | ICD-10-CM

## 2023-09-20 NOTE — PROGRESS NOTES
[employer] [facility]  Diabetes Self-Management Education & Support Program  Encounter Note      SUMMARY  Diabetes self-care management training was completed related to healthy coping and problem solving. he participant will return on November 08 to continue DSMES related to post program and troubleshooting pump therapy. The participant did identify SMART Goal(s) and will practice knowledge and skills related to reducing risks, healthy eating and monitoring, being active and medications, and healthy coping and problem solving to improve diabetes self-management. EVALUATION:  Reynaldo Millard shared that her A1c is improving down 3.7 points. She is motivated to continue to improve this value - feels stress management and meal prepping will help going forward. Addressed questions she had about her pumping and alerts - investigate if vibrate works better overnight when phone is on bedside without protective cover or if placed on bed - do you feel the alert. RECOMMENDATIONS:  1) continue to use current stress management techniques  2) work your new goal to avoid missing meals and then dealing with hypoglycemia  3) call me if you have any questions prior to next visit. TOPICS DISCUSSED TODAY:  HOW DO I FIND SUPPORT TO TACKLE THIS CONDITION? 30  HOW DO I FIGURE OUT WHAT'S INFLUENCING MY BLOOD GLUCOSES? 30      Next provider visit is scheduled for 12/11/23       SMART GOAL(S)   Meal prepping by using my crockpot or air fryer 1-2 times a week. ACHIEVEMENT OF GOAL(S) : 0-24%           DATE DSMES TOPIC EVALUATION     9/20/2023 HOW DO I FIND SUPPORT TO TACKLE THIS CONDITION?    Normal responses to diabetes diagnosis or complication   Shock   Anger & resentment   Guilt/self-blame   Sadness & worry   Depression    Anxiety   Pregnancy   Constructive strategies to normal responses    Exploring feelings & attitudes   Motivation: Cost versus benefits analysis   Problem-solving: Chain analysis   Obtaining

## 2023-11-21 DIAGNOSIS — E10.65 TYPE 1 DIABETES MELLITUS WITH HYPERGLYCEMIA (HCC): ICD-10-CM

## 2023-11-30 ENCOUNTER — OFFICE VISIT (OUTPATIENT)
Age: 28
End: 2023-11-30
Payer: COMMERCIAL

## 2023-11-30 DIAGNOSIS — E10.65 TYPE 1 DIABETES MELLITUS WITH HYPERGLYCEMIA (HCC): Primary | ICD-10-CM

## 2023-11-30 PROCEDURE — G0108 DIAB MANAGE TRN  PER INDIV: HCPCS | Performed by: DIETITIAN, REGISTERED

## 2023-12-01 NOTE — PROGRESS NOTES
alcohol as needed, provided [employer] to try and also to consider consulting with dermatology. Lindy Saucedo RD, [facility] on 12/1/2023 at 9:38 AM    I have personally reviewed the health record, including provider notes, laboratory data and current medications before making these care and education recommendations.    Total minutes: 60

## 2023-12-05 LAB
ALBUMIN SERPL-MCNC: 4.2 G/DL (ref 4–5)
ALBUMIN/CREAT UR: 6 MG/G CREAT (ref 0–29)
ALBUMIN/GLOB SERPL: 1.7 {RATIO} (ref 1.2–2.2)
ALP SERPL-CCNC: 132 IU/L (ref 44–121)
ALT SERPL-CCNC: 20 IU/L (ref 0–32)
AST SERPL-CCNC: 20 IU/L (ref 0–40)
BILIRUB SERPL-MCNC: <0.2 MG/DL (ref 0–1.2)
BUN SERPL-MCNC: 12 MG/DL (ref 6–20)
BUN/CREAT SERPL: 14 (ref 9–23)
CALCIUM SERPL-MCNC: 9.2 MG/DL (ref 8.7–10.2)
CHLORIDE SERPL-SCNC: 104 MMOL/L (ref 96–106)
CHOLEST SERPL-MCNC: 167 MG/DL (ref 100–199)
CO2 SERPL-SCNC: 23 MMOL/L (ref 20–29)
CREAT SERPL-MCNC: 0.83 MG/DL (ref 0.57–1)
CREAT UR-MCNC: 129.2 MG/DL
EGFRCR SERPLBLD CKD-EPI 2021: 98 ML/MIN/1.73
GLOBULIN SER CALC-MCNC: 2.5 G/DL (ref 1.5–4.5)
GLUCOSE SERPL-MCNC: 143 MG/DL (ref 70–99)
HBA1C MFR BLD: 12 % (ref 4.8–5.6)
HDLC SERPL-MCNC: 67 MG/DL
IMP & REVIEW OF LAB RESULTS: NORMAL
LDLC SERPL CALC-MCNC: 89 MG/DL (ref 0–99)
Lab: NORMAL
MICROALBUMIN UR-MCNC: 7.6 UG/ML
POTASSIUM SERPL-SCNC: 4.3 MMOL/L (ref 3.5–5.2)
PROT SERPL-MCNC: 6.7 G/DL (ref 6–8.5)
SODIUM SERPL-SCNC: 138 MMOL/L (ref 134–144)
TRIGL SERPL-MCNC: 54 MG/DL (ref 0–149)
TSH SERPL DL<=0.005 MIU/L-ACNC: 0.85 UIU/ML (ref 0.45–4.5)
VLDLC SERPL CALC-MCNC: 11 MG/DL (ref 5–40)

## 2023-12-11 ENCOUNTER — OFFICE VISIT (OUTPATIENT)
Age: 28
End: 2023-12-11
Payer: COMMERCIAL

## 2023-12-11 VITALS
HEART RATE: 96 BPM | WEIGHT: 224 LBS | DIASTOLIC BLOOD PRESSURE: 85 MMHG | BODY MASS INDEX: 35.08 KG/M2 | SYSTOLIC BLOOD PRESSURE: 116 MMHG

## 2023-12-11 DIAGNOSIS — E10.65 TYPE 1 DIABETES MELLITUS WITH HYPERGLYCEMIA (HCC): Primary | ICD-10-CM

## 2023-12-11 PROCEDURE — 99214 OFFICE O/P EST MOD 30 MIN: CPT | Performed by: GENERAL ACUTE CARE HOSPITAL

## 2023-12-11 RX ORDER — URINE ACETONE TEST STRIPS
STRIP MISCELLANEOUS
Qty: 50 STRIP | Refills: 5 | Status: SHIPPED | OUTPATIENT
Start: 2023-12-11

## 2023-12-11 NOTE — PROGRESS NOTES
=    [facility]   DR RALEIGH YOUNGBLOOD       REFERRED BY: Sil Figueroa MD       REASON:  Uncontrolled type 1 diabetes      CHIEF COMPLAINT: Blood glucose is high      HISTORY OF PRESENT ILLNESS:    Sofia Franklin is a 28 y.o. female with a PMHx as noted below who presents for evaluation of uncontrolled type 1 diabetes.   Was seeing [facility] in [place], has not seen someone since moving to [place]    12/11/23  Denies new complaints  Not compliant with medications takes insulin pump off at times regularly as she feels tired with wearing it but wants to be more compliant with it, no DKA episodes    Review of most recent hemoglobin A1c :  Lab Results   Component Value Date    AZQ2UXQB 9.9 08/21/2023    LABA1C 12.0 (H) 12/04/2023    LABA1C 13.3 (H) 01/22/2023    LABA1C 13.0 (H) 10/14/2022              8/21/23    Have not seen ms Black since she has started using the tandem pump  His hemoglobin A1c is improved from 13.3% to 9.9%   Has been out of insulin pump supplies for 3 days, she wore it again today         03/14/23   In the last visit we adjusted ms Black's to receive Tresiba 46 to 44 and increase her NovoLog to 20 units plus sliding scale, she did not send the Dexcom to visit, we were able to get a copy of today.  She continues to very high blood sugar that fluctuate  with rapid drops very low.  Indicates that she was taking her insulin AFTER meals contributing to her hypoglycemia after meals.  She continues to drink regular soda with dinner.      Diabetes History:   Diabetes was diagnosed 2010, 14 yrs old   Family History of diabetes is maternal grandparent DM1, brother DM1, paternal grandfather DM2   Hb A1c :  13.3% 01/22/2023,  13.0%  10/14/2022  9.9% 8/21/23       Tandem T-slim 2x started 03/2023     Was on Medtronic 670G 5 years ago, was on it for 4-5 years      Review of home glucose:   Has Dexcom G6 for past 2 years   See full report scanned       Hypoglycemia:yes - few times

## 2023-12-11 NOTE — PATIENT INSTRUCTIONS
PLAN FOR TODAY    We will plan to make the following changes to your diabetes medications:  Keep up the good work.  Based on the new insulin pump settings if you are having anymore blood sugars below 70 or consistently above 200 please give us a call.    It will be important to continue checking your glucose just as you did previously.   I would like you at the very least to check you glucose during:    AM fasting before breakfast  Before Lunch   Before Dinner    Any other time that you are not feeling well.     Always provide a glucose log that is completed at every visit so that we can review the results of your home glucose together. Without this, it is not possible to make accurate changes to your diabetes regimen.    Lanre Puri MD  [facility]       Hypoglycemia:    Hypoglycemia means that your blood sugar is low and your body is not getting enough fuel. Some people get low blood sugar from not eating often enough. Some medicines to treat diabetes can cause low blood sugar. People who have had surgery on their stomachs or intestines may get hypoglycemia. Problems with the pancreas, kidneys, or liver also can cause low blood sugar.  A snack or drink with sugar in it will raise your blood sugar and should ease your symptoms right away.  How can you care for yourself at home?  Learn your signs of low blood sugar. They are different for everyone. Some common early signs include:  Nausea.  Hunger.  Feeling nervous, irritable, or shaky.  Cold, clammy skin.  Sweating (when you're not exercising).  Use the \"rule of 15\" to treat low blood sugar. This includes eating 15 grams of carbohydrate from a quick-sugar food, such as 3 or 4 glucose tablets or ½ cup of juice. Wait 15 minutes and check your blood sugar. If it is still below 70 mg/dL, eat another 15 grams of carbohydrate. Repeat this every 15 minutes until your blood sugar is in a safe target range.  Once your blood sugar is in a safe range,

## 2024-01-08 ENCOUNTER — OFFICE VISIT (OUTPATIENT)
Age: 29
End: 2024-01-08
Payer: COMMERCIAL

## 2024-01-08 VITALS
WEIGHT: 218.6 LBS | OXYGEN SATURATION: 99 % | TEMPERATURE: 97.8 F | HEART RATE: 97 BPM | SYSTOLIC BLOOD PRESSURE: 103 MMHG | HEIGHT: 68 IN | DIASTOLIC BLOOD PRESSURE: 72 MMHG | RESPIRATION RATE: 20 BRPM | BODY MASS INDEX: 33.13 KG/M2

## 2024-01-08 DIAGNOSIS — Z00.00 WELL ADULT EXAM: ICD-10-CM

## 2024-01-08 DIAGNOSIS — Z76.89 ENCOUNTER TO ESTABLISH CARE: Primary | ICD-10-CM

## 2024-01-08 DIAGNOSIS — E10.65 TYPE 1 DIABETES MELLITUS WITH HYPERGLYCEMIA (HCC): ICD-10-CM

## 2024-01-08 DIAGNOSIS — L70.0 ACNE VULGARIS: ICD-10-CM

## 2024-01-08 PROCEDURE — 99385 PREV VISIT NEW AGE 18-39: CPT | Performed by: STUDENT IN AN ORGANIZED HEALTH CARE EDUCATION/TRAINING PROGRAM

## 2024-01-08 SDOH — ECONOMIC STABILITY: INCOME INSECURITY: HOW HARD IS IT FOR YOU TO PAY FOR THE VERY BASICS LIKE FOOD, HOUSING, MEDICAL CARE, AND HEATING?: NOT HARD AT ALL

## 2024-01-08 SDOH — ECONOMIC STABILITY: HOUSING INSECURITY
IN THE LAST 12 MONTHS, WAS THERE A TIME WHEN YOU DID NOT HAVE A STEADY PLACE TO SLEEP OR SLEPT IN A SHELTER (INCLUDING NOW)?: NO

## 2024-01-08 SDOH — ECONOMIC STABILITY: FOOD INSECURITY: WITHIN THE PAST 12 MONTHS, THE FOOD YOU BOUGHT JUST DIDN'T LAST AND YOU DIDN'T HAVE MONEY TO GET MORE.: NEVER TRUE

## 2024-01-08 SDOH — ECONOMIC STABILITY: FOOD INSECURITY: WITHIN THE PAST 12 MONTHS, YOU WORRIED THAT YOUR FOOD WOULD RUN OUT BEFORE YOU GOT MONEY TO BUY MORE.: NEVER TRUE

## 2024-01-08 ASSESSMENT — PATIENT HEALTH QUESTIONNAIRE - PHQ9
SUM OF ALL RESPONSES TO PHQ QUESTIONS 1-9: 0
1. LITTLE INTEREST OR PLEASURE IN DOING THINGS: 0
SUM OF ALL RESPONSES TO PHQ QUESTIONS 1-9: 0
SUM OF ALL RESPONSES TO PHQ QUESTIONS 1-9: 0
SUM OF ALL RESPONSES TO PHQ9 QUESTIONS 1 & 2: 0
2. FEELING DOWN, DEPRESSED OR HOPELESS: 0
SUM OF ALL RESPONSES TO PHQ QUESTIONS 1-9: 0

## 2024-01-08 NOTE — PROGRESS NOTES
Chief Complaint   Patient presents with    Establish Care     Physical        \"Have you been to the ER, urgent care clinic since your last visit?  Hospitalized since your last visit?\"    NO    “Have you seen or consulted any other health care providers outside of [facility] since your last visit?”    NO              Vitals:    24 0813   BP: 103/72   Pulse: 97   Resp: 20   Temp: 97.8 °F (36.6 °C)   SpO2: 99%        Health Maintenance Due   Topic Date Due    Hepatitis B vaccine (1 of 3 - 3-dose series) Never done    COVID-19 Vaccine (1) Never done    Varicella vaccine (1 of 2 - 2-dose childhood series) Never done    Diabetic foot exam  Never done    HIV screen  Never done    Diabetic retinal exam  Never done    DTaP/Tdap/Td vaccine (1 - Tdap) Never done    Pap smear  Never done    Pneumococcal 0-64 years Vaccine (2 - PCV) 10/13/2017    Flu vaccine (1) 2023    Depression Screen  10/14/2023        The patient, Sofia Franklin, identity was verified by name and .   
  Vaping Use    Vaping Use: Never used   Substance and Sexual Activity    Alcohol use: Not Currently    Drug use: Never    Sexual activity: Yes     Partners: Male     Birth control/protection: Injection   Other Topics Concern    Not on file   Social History Narrative    Master's in student affairs, has a poodle (Harrison), hobbies (walks her dog, craft with her [place], hang out friends)     Social Determinants of Health     Financial Resource Strain: Low Risk  (1/8/2024)    Overall Financial Resource Strain (CARDIA)     Difficulty of Paying Living Expenses: Not hard at all   Food Insecurity: No Food Insecurity (1/8/2024)    Hunger Vital Sign     Worried About Running Out of Food in the Last Year: Never true     Ran Out of Food in the Last Year: Never true   Transportation Needs: Unknown (1/8/2024)    PRAPARE - Transportation     Lack of Transportation (Medical): Not on file     Lack of Transportation (Non-Medical): No   Physical Activity: Not on file   Stress: Not on file   Social Connections: Not on file   Intimate Partner Violence: Not on file   Housing Stability: Unknown (1/8/2024)    Housing Stability Vital Sign     Unable to Pay for Housing in the Last Year: Not on file     Number of Places Lived in the Last Year: Not on file     Unstable Housing in the Last Year: No       Current Outpatient Medications on File Prior to Visit   Medication Sig Dispense Refill    KETOSTIX strip Use as directed for urine test to rule out DKA E10.65 50 strip 5    GVOKE HYPOPEN 2-PACK 1 MG/0.2ML SOAJ Use for severe low blood glucose as needed 0.4 mL 1    Continuous Blood Gluc Sensor (DEXCOM G6 SENSOR) MISC Use 1 sensor for 10 days E11.65 3 each 11    Continuous Blood Gluc Transmit (DEXCOM G6 TRANSMITTER) MISC Use 1 transmitter for 90 days with Dexcom G6 sensors E11.65 1 each 3    HUMALOG 100 UNIT/ML injection vial For use with insulin pump, max dose of 150 units per day 90 mL 3    insulin glargine (LANTUS SOLOSTAR) 100 UNIT/ML

## 2024-01-15 ENCOUNTER — TELEPHONE (OUTPATIENT)
Age: 29
End: 2024-01-15

## 2024-01-15 NOTE — TELEPHONE ENCOUNTER
----- Message from Sofia Franklin sent at 1/15/2024  2:50 PM EST -----  Regarding: Medicine Request   Contact: [phone]  Good Afternoon,    I was wondering if you could prescribe me FLUCONAZOLE 100 MG TABLET. I have a yeast infection.

## 2024-01-16 ENCOUNTER — OFFICE VISIT (OUTPATIENT)
Age: 29
End: 2024-01-16
Payer: COMMERCIAL

## 2024-01-16 VITALS
TEMPERATURE: 97.7 F | DIASTOLIC BLOOD PRESSURE: 70 MMHG | RESPIRATION RATE: 18 BRPM | OXYGEN SATURATION: 99 % | HEIGHT: 68 IN | BODY MASS INDEX: 33.92 KG/M2 | WEIGHT: 223.8 LBS | SYSTOLIC BLOOD PRESSURE: 112 MMHG | HEART RATE: 98 BPM

## 2024-01-16 DIAGNOSIS — B37.31 VAGINAL CANDIDIASIS: Primary | ICD-10-CM

## 2024-01-16 PROCEDURE — 99213 OFFICE O/P EST LOW 20 MIN: CPT | Performed by: STUDENT IN AN ORGANIZED HEALTH CARE EDUCATION/TRAINING PROGRAM

## 2024-01-16 RX ORDER — FLUCONAZOLE 150 MG/1
TABLET ORAL
Qty: 2 TABLET | Refills: 0 | Status: SHIPPED | OUTPATIENT
Start: 2024-01-16

## 2024-01-16 ASSESSMENT — PATIENT HEALTH QUESTIONNAIRE - PHQ9
1. LITTLE INTEREST OR PLEASURE IN DOING THINGS: 0
SUM OF ALL RESPONSES TO PHQ QUESTIONS 1-9: 0

## 2024-01-16 NOTE — PROGRESS NOTES
Chief Complaint   Patient presents with    Vaginal Itching     No n/v/b  swollen        \"Have you been to the ER, urgent care clinic since your last visit?  Hospitalized since your last visit?\"    NO    “Have you seen or consulted any other health care providers outside of [facility] since your last visit?”    NO              Vitals:    24 0929   BP: 112/70   Pulse: 98   Resp: 18   Temp: 97.7 °F (36.5 °C)   SpO2: 99%        Health Maintenance Due   Topic Date Due    Hepatitis B vaccine (1 of 3 - 3-dose series) Never done    COVID-19 Vaccine (1) Never done    Varicella vaccine (1 of 2 - 2-dose childhood series) Never done    Diabetic foot exam  Never done    HIV screen  Never done    Diabetic retinal exam  Never done    DTaP/Tdap/Td vaccine (1 - Tdap) Never done    Pap smear  Never done    Pneumococcal 0-64 years Vaccine (2 - PCV) 10/13/2017    Flu vaccine (1) 2023        The patient, Sofia Franklin, identity was verified by name and .

## 2024-01-16 NOTE — PROGRESS NOTES
[facility]  [address] [address] [phone]    Office Visit      Assessment and Plan     1. Vaginal candidiasis  Acute. Patient elected for simple self swab today. Declined visual exam or Nuswab for STI testing. KOH negative for whiff test. Thick white discharge seen on collected swab. Instructed patient to finish course of HoneyPot; however, if symptoms continue use Fluconazole  - fluconazole (DIFLUCAN) 150 MG tablet; Take 1 tablet by mouth once, repeat in 72 hours if symptoms reoccur  Dispense: 2 tablet; Refill: 0        Return if symptoms worsen or fail to improve.         Discussed the expected course, resolution and complications of the diagnosis(es) in detail.  Medication risks, benefits, costs, interactions, and alternatives were discussed as indicated.  Patient to contact the office if their condition worsens, changes or fails to improve. Pt verbalized understanding with the diagnosis(es) and plan.           Rosalie Flores DO    01/16/24   10:12 AM        Subjective     CC:   Chief Complaint   Patient presents with    Vaginal Itching     No n/v/b  swollen     HPI: Sofia Franklin is a 28 y.o. female presenting to the clinic today for evaluation and/or follow-up of the following concerns:    Vaginal concerns  -Had a yeast infection took Honey Pot and it cleared it up. Started having vaginal itching on Sunday morning, honey pot suppository placed on Sunday and Monday  -No discharge  -No symptoms currently  -Gets yeast infections when sugar are out of control. Last one was 1 year ago          Review of systems:   A comprehensive review of systems was negative except as written in the HPI.    History  Patient's allergies, medical, surgical, social, and family hx reviewed and available in chart. Updates made where appropriate.       Objective     /70 (Site: Right Upper Arm, Position: Sitting, Cuff Size: Large Adult)   Pulse 98

## 2024-06-04 ENCOUNTER — OFFICE VISIT (OUTPATIENT)
Age: 29
End: 2024-06-04

## 2024-06-04 VITALS
HEART RATE: 103 BPM | DIASTOLIC BLOOD PRESSURE: 79 MMHG | WEIGHT: 245.8 LBS | SYSTOLIC BLOOD PRESSURE: 117 MMHG | HEIGHT: 68 IN | BODY MASS INDEX: 37.25 KG/M2

## 2024-06-04 DIAGNOSIS — E10.65 TYPE 1 DIABETES MELLITUS WITH HYPERGLYCEMIA (HCC): Primary | ICD-10-CM

## 2024-06-04 DIAGNOSIS — E66.9 OBESITY (BMI 30-39.9): ICD-10-CM

## 2024-06-04 LAB — HBA1C MFR BLD: 8.3 %

## 2024-06-04 NOTE — PROGRESS NOTES
=    [facility]   DR RALEIGH YOUNGBLOOD       REFERRED BY: Sil Figueroa MD       REASON:  Uncontrolled type 1 diabetes      CHIEF COMPLAINT: Blood glucose is high      HISTORY OF PRESENT ILLNESS:    Sofia Franklin is a 28 y.o. female with a PMHx as noted below who presents for evaluation of uncontrolled type 1 diabetes.   Was seeing [facility] in [place], has not seen someone since moving to [place]    6/4/24  Denies new complaints    Review of most recent hemoglobin A1c :  Lab Results   Component Value Date    PQA4XQBG 8.3 06/04/2024    RWE7CEEA 9.9 08/21/2023    LABA1C 12.0 (H) 12/04/2023    LABA1C 13.3 (H) 01/22/2023    LABA1C 13.0 (H) 10/14/2022        12/11/23  Denies new complaints  Not compliant with medications takes insulin pump off at times regularly as she feels tired with wearing it but wants to be more compliant with it, no DKA episodes    Review of most recent hemoglobin A1c :  Lab Results   Component Value Date    CKC4DYYY 8.3 06/04/2024    WOV2YPHJ 9.9 08/21/2023    LABA1C 12.0 (H) 12/04/2023    LABA1C 13.3 (H) 01/22/2023    LABA1C 13.0 (H) 10/14/2022              8/21/23    Have not seen ms Black since she has started using the tandem pump  His hemoglobin A1c is improved from 13.3% to 9.9%   Has been out of insulin pump supplies for 3 days, she wore it again today         03/14/23   In the last visit we adjusted ms Black's to receive Tresiba 46 to 44 and increase her NovoLog to 20 units plus sliding scale, she did not send the Dexcom to visit, we were able to get a copy of today.  She continues to very high blood sugar that fluctuate  with rapid drops very low.  Indicates that she was taking her insulin AFTER meals contributing to her hypoglycemia after meals.  She continues to drink regular soda with dinner.      Diabetes History:   Diabetes was diagnosed 2010, 14 yrs old   Family History of diabetes is maternal grandparent DM1, brother DM1, paternal grandfather DM2

## 2024-06-04 NOTE — PATIENT INSTRUCTIONS
blood sugar is in a safe range, eat a snack or meal to prevent recurrent low blood sugar.  Make sure family, friends, and coworkers know the symptoms of low blood sugar and know how to get your sugar level up.  If you were prescribed glucagon, always have it with you. Make sure friends and family know how to use it.  When should you call for help?     Call 911 anytime you think you may need emergency care. For example, call if:    You passed out (lost consciousness).     You are confused or cannot think clearly.     Your blood sugar is very high or very low.     Watch closely for changes in your health, and be sure to contact your doctor if:    Your blood sugar stays outside the level your doctor set for you.     You have any problems.       Ketones:  - Check urine with Ketostix when blood sugar is more than 300 OR when ill (even if blood glucose is normal)  - Always drink extra water for ketones that are trace or small.  - Give 3 extra units of Humalog for moderate ketones every 3 hours.   - Give 6 extra units of Humalog for large ketones every 3 hours.  - If you have moderate or large ketones and blood sugars are <200 = sip fluids with carbs (sugar-containing beverages like juice or ginger ale)  - If you have moderate or large ketones and blood sugars are >200 = sip water   - Call immediately for vomiting  - If you have given a correction bolus x 2 for ketones, and ketones do not improve call the on-call doctor     In case of pump failure, begin using 18 units of lantus daily. Be sure to wait the 20 hours from the last Lantus dose to re-start your pump.  If you can smell insulin or have persistent highs even though you are bolusing through the pump, you may have a pump failure.     Anytime ketones are positive, continue to check for ketones until they are negative two times in a row. Reminded to make sure urine ketone testing strips are fresh. Write the date on the side of the bottle when opened as they will

## 2024-06-13 RX ORDER — PROCHLORPERAZINE 25 MG/1
SUPPOSITORY RECTAL
Qty: 9 EACH | Refills: 3 | Status: SHIPPED | OUTPATIENT
Start: 2024-06-13

## 2024-06-13 RX ORDER — INSULIN LISPRO 100 [IU]/ML
INJECTION, SOLUTION INTRAVENOUS; SUBCUTANEOUS
Qty: 30 ML | Refills: 5 | Status: SHIPPED | OUTPATIENT
Start: 2024-06-13

## 2024-07-14 RX ORDER — PROCHLORPERAZINE 25 MG/1
SUPPOSITORY RECTAL
Qty: 1 EACH | Refills: 3 | Status: SHIPPED | OUTPATIENT
Start: 2024-07-14

## 2024-09-04 DIAGNOSIS — E10.65 TYPE 1 DIABETES MELLITUS WITH HYPERGLYCEMIA (HCC): ICD-10-CM

## 2024-10-04 LAB — HBA1C MFR BLD: 9.1 % (ref 4.8–5.6)

## 2024-10-05 LAB
ALBUMIN SERPL-MCNC: 4.1 G/DL (ref 4–5)
ALP SERPL-CCNC: 124 IU/L (ref 44–121)
ALT SERPL-CCNC: 22 IU/L (ref 0–32)
AST SERPL-CCNC: 26 IU/L (ref 0–40)
BILIRUB SERPL-MCNC: 0.4 MG/DL (ref 0–1.2)
BUN SERPL-MCNC: 10 MG/DL (ref 6–20)
BUN/CREAT SERPL: 14 (ref 9–23)
CALCIUM SERPL-MCNC: 9.4 MG/DL (ref 8.7–10.2)
CHLORIDE SERPL-SCNC: 104 MMOL/L (ref 96–106)
CHOLEST SERPL-MCNC: 156 MG/DL (ref 100–199)
CO2 SERPL-SCNC: 23 MMOL/L (ref 20–29)
CREAT SERPL-MCNC: 0.74 MG/DL (ref 0.57–1)
EGFRCR SERPLBLD CKD-EPI 2021: 112 ML/MIN/1.73
GLOBULIN SER CALC-MCNC: 2.2 G/DL (ref 1.5–4.5)
GLUCOSE SERPL-MCNC: 109 MG/DL (ref 70–99)
HDLC SERPL-MCNC: 64 MG/DL
LDLC SERPL CALC-MCNC: 80 MG/DL (ref 0–99)
POTASSIUM SERPL-SCNC: 4.7 MMOL/L (ref 3.5–5.2)
PROT SERPL-MCNC: 6.3 G/DL (ref 6–8.5)
SODIUM SERPL-SCNC: 142 MMOL/L (ref 134–144)
TRIGL SERPL-MCNC: 57 MG/DL (ref 0–149)
TSH SERPL DL<=0.005 MIU/L-ACNC: 1.68 UIU/ML (ref 0.45–4.5)
VLDLC SERPL CALC-MCNC: 12 MG/DL (ref 5–40)

## 2024-10-06 LAB
ALBUMIN/CREAT UR: 6 MG/G CREAT (ref 0–29)
CREAT UR-MCNC: 80.3 MG/DL
IMP & REVIEW OF LAB RESULTS: NORMAL
Lab: NORMAL
MICROALBUMIN UR-MCNC: 5.2 UG/ML

## 2024-10-07 ENCOUNTER — OFFICE VISIT (OUTPATIENT)
Age: 29
End: 2024-10-07
Payer: COMMERCIAL

## 2024-10-07 VITALS
HEIGHT: 67 IN | DIASTOLIC BLOOD PRESSURE: 76 MMHG | SYSTOLIC BLOOD PRESSURE: 107 MMHG | HEART RATE: 97 BPM | BODY MASS INDEX: 38.36 KG/M2 | WEIGHT: 244.4 LBS

## 2024-10-07 DIAGNOSIS — E10.65 TYPE 1 DIABETES MELLITUS WITH HYPERGLYCEMIA (HCC): Primary | ICD-10-CM

## 2024-10-07 DIAGNOSIS — E66.9 OBESITY (BMI 30-39.9): ICD-10-CM

## 2024-10-07 PROCEDURE — 99214 OFFICE O/P EST MOD 30 MIN: CPT | Performed by: GENERAL ACUTE CARE HOSPITAL

## 2024-10-07 PROCEDURE — 3046F HEMOGLOBIN A1C LEVEL >9.0%: CPT | Performed by: GENERAL ACUTE CARE HOSPITAL

## 2024-10-07 NOTE — PATIENT INSTRUCTIONS
PLAN FOR TODAY    Keep up the good work.  Please give yourself meal time boluses, on average between 2 to 4 units and keep working on carb counting.    If you are having anymore blood sugars below 70 or consistently above 200 please give us a call.    It will be important to continue checking your glucose just as you did previously.   I would like you at the very least to check you glucose during:    AM fasting before breakfast  Before Lunch   Before Dinner    Any other time that you are not feeling well.     Always provide a glucose log that is completed at every visit so that we can review the results of your home glucose together. Without this, it is not possible to make accurate changes to your diabetes regimen.    Lanre Puri MD  [facility]       Hypoglycemia:    Hypoglycemia means that your blood sugar is low and your body is not getting enough fuel. Some people get low blood sugar from not eating often enough. Some medicines to treat diabetes can cause low blood sugar. People who have had surgery on their stomachs or intestines may get hypoglycemia. Problems with the pancreas, kidneys, or liver also can cause low blood sugar.  A snack or drink with sugar in it will raise your blood sugar and should ease your symptoms right away.  How can you care for yourself at home?  Learn your signs of low blood sugar. They are different for everyone. Some common early signs include:  Nausea.  Hunger.  Feeling nervous, irritable, or shaky.  Cold, clammy skin.  Sweating (when you're not exercising).  Use the \"rule of 15\" to treat low blood sugar. This includes eating 15 grams of carbohydrate from a quick-sugar food, such as 3 or 4 glucose tablets or ½ cup of juice. Wait 15 minutes and check your blood sugar. If it is still below 70 mg/dL, eat another 15 grams of carbohydrate. Repeat this every 15 minutes until your blood sugar is in a safe target range.  Once your blood sugar is in a safe range, eat a

## 2024-10-07 NOTE — PROGRESS NOTES
escaped final proofreading.  Thank you.       [facility]        There are no Patient Instructions on file for this visit.

## 2025-02-10 DIAGNOSIS — E10.65 TYPE 1 DIABETES MELLITUS WITH HYPERGLYCEMIA (HCC): Primary | ICD-10-CM

## 2025-02-10 RX ORDER — INSULIN LISPRO 100 [IU]/ML
INJECTION, SOLUTION INTRAVENOUS; SUBCUTANEOUS
Qty: 30 ML | Refills: 5 | Status: CANCELLED | OUTPATIENT
Start: 2025-02-10

## 2025-02-12 RX ORDER — INSULIN LISPRO 100 [IU]/ML
INJECTION, SOLUTION INTRAVENOUS; SUBCUTANEOUS
Qty: 30 ML | Refills: 5 | Status: SHIPPED | OUTPATIENT
Start: 2025-02-12

## 2025-04-07 ENCOUNTER — OFFICE VISIT (OUTPATIENT)
Age: 30
End: 2025-04-07
Payer: COMMERCIAL

## 2025-04-07 VITALS
DIASTOLIC BLOOD PRESSURE: 85 MMHG | SYSTOLIC BLOOD PRESSURE: 130 MMHG | HEART RATE: 96 BPM | BODY MASS INDEX: 40.37 KG/M2 | WEIGHT: 257.2 LBS | HEIGHT: 67 IN

## 2025-04-07 DIAGNOSIS — E10.65 TYPE 1 DIABETES MELLITUS WITH HYPERGLYCEMIA (HCC): Primary | ICD-10-CM

## 2025-04-07 DIAGNOSIS — E66.01 MORBID OBESITY: ICD-10-CM

## 2025-04-07 LAB — HBA1C MFR BLD: 9.9 %

## 2025-04-07 PROCEDURE — 83036 HEMOGLOBIN GLYCOSYLATED A1C: CPT | Performed by: GENERAL ACUTE CARE HOSPITAL

## 2025-04-07 PROCEDURE — 99214 OFFICE O/P EST MOD 30 MIN: CPT | Performed by: GENERAL ACUTE CARE HOSPITAL

## 2025-04-07 PROCEDURE — 3046F HEMOGLOBIN A1C LEVEL >9.0%: CPT | Performed by: GENERAL ACUTE CARE HOSPITAL

## 2025-04-07 PROCEDURE — 95251 CONT GLUC MNTR ANALYSIS I&R: CPT | Performed by: GENERAL ACUTE CARE HOSPITAL

## 2025-04-07 RX ORDER — GLUCAGON INJECTION, SOLUTION 1 MG/.2ML
INJECTION, SOLUTION SUBCUTANEOUS
Qty: 0.4 ML | Refills: 1 | Status: SHIPPED | OUTPATIENT
Start: 2025-04-07

## 2025-04-07 RX ORDER — INSULIN LISPRO 100 [IU]/ML
INJECTION, SOLUTION INTRAVENOUS; SUBCUTANEOUS
Qty: 30 ML | Refills: 11 | Status: SHIPPED | OUTPATIENT
Start: 2025-04-07

## 2025-04-07 RX ORDER — ACYCLOVIR 400 MG/1
TABLET ORAL
Qty: 1 EACH | Refills: 0 | Status: SHIPPED | OUTPATIENT
Start: 2025-04-07

## 2025-04-07 RX ORDER — URINE ACETONE TEST STRIPS
STRIP MISCELLANEOUS
Qty: 50 STRIP | Refills: 5 | Status: SHIPPED | OUTPATIENT
Start: 2025-04-07

## 2025-04-07 RX ORDER — ACYCLOVIR 400 MG/1
TABLET ORAL
Qty: 9 EACH | Refills: 3 | Status: SHIPPED | OUTPATIENT
Start: 2025-04-07

## 2025-04-07 NOTE — PROGRESS NOTES
[facility]   DR RALEIGH YOUNGBLOOD      The patient (or guardian, if applicable) and other individuals in attendance with the patient were advised that Artificial Intelligence will be utilized during this visit to record, process the conversation to generate a clinical note, and support improvement of the AI technology. The patient (or guardian, if applicable) and other individuals in attendance at the appointment consented to the use of AI, including the recording.         ASSESSMENT AND PLAN:     Type 1 diabetes Uncontrolled      The biggest issue with blood sugar control remains as the dietary noncompliance and taking insulin pump off and not bolusing before meals because she is 'on the go' and has many life stressors at this time affecting her ability to focus on her sugars    - A1c levels have increased from 9.1 to 9.9, with a previous reading of 8.3 in 06/2024  - Daily insulin usage is approximately 63 units, average blood glucose level over the past four weeks is around 9%  - Instances where Control-IQ system has gone offline and pump inactive for certain periods  - Prescriptions for Humalog vials with 11 refills, ketone sticks, and Dexcom G7 with nine refills provided and sent to [facility] on [address]    To take meal time boluses, on average between 2 to 4 units and keep working on carb counting.      PLAN   Type 1 Diabetes   A1c goal:7%      Medications: Humalog, Tandem Tslim 2x, Dexcom G6 >>> wants to use Dexcom G7     Current settings:       New settings        Advised to wear insulin pump more regularly before we make further settings adjustments, patient indicates understanding and agrees with plan.     Advised to check glucose 4-5x/day by CGM   Referred for DM education and attended classes   UTD with Ophthalm   Regular foot self checks      BP: Well-controlled today, no changes   HLD: Fasting lipids /reviewed, well controlled off statin   Obesity BMI 40: discussed lifestyle

## 2025-04-07 NOTE — PATIENT INSTRUCTIONS
drive:   -Check your blood glucose before driving.   -If it's low, treat the hypoglycemia and wait until you're at a safe level before driving.   -Keep fast-acting carbohydrates such as glucose tablets or a juice box and extra snacks in the car.   -If you start feeling low while you're driving, pull over safely and check your blood glucose. Checking your blood glucose or treating a high or low reading should not be done while driving.    -If low, don't begin driving again until you have treated it and your blood glucose is back to a safe level.  -In case of an emergency, having extra diabetes supplies such as a spare meter, test strips, and pump supplies in the car is a smart move.   -If you have one, wear your diabetes medical ID or have your Diabetes Alert Card in your wallet. These can help first responders treat you more quickly.               For more food/recipe information:    American Diabetes Association website: https://www.diabetesfoodhub.org  Dia[employer]ribe : https://diatribe.org/diabetes-recipes

## 2025-06-29 SDOH — ECONOMIC STABILITY: TRANSPORTATION INSECURITY
IN THE PAST 12 MONTHS, HAS LACK OF TRANSPORTATION KEPT YOU FROM MEETINGS, WORK, OR FROM GETTING THINGS NEEDED FOR DAILY LIVING?: NO

## 2025-06-29 SDOH — ECONOMIC STABILITY: FOOD INSECURITY: WITHIN THE PAST 12 MONTHS, THE FOOD YOU BOUGHT JUST DIDN'T LAST AND YOU DIDN'T HAVE MONEY TO GET MORE.: NEVER TRUE

## 2025-06-29 SDOH — ECONOMIC STABILITY: FOOD INSECURITY: WITHIN THE PAST 12 MONTHS, YOU WORRIED THAT YOUR FOOD WOULD RUN OUT BEFORE YOU GOT MONEY TO BUY MORE.: NEVER TRUE

## 2025-06-29 SDOH — ECONOMIC STABILITY: INCOME INSECURITY: IN THE LAST 12 MONTHS, WAS THERE A TIME WHEN YOU WERE NOT ABLE TO PAY THE MORTGAGE OR RENT ON TIME?: NO

## 2025-06-29 SDOH — ECONOMIC STABILITY: TRANSPORTATION INSECURITY
IN THE PAST 12 MONTHS, HAS THE LACK OF TRANSPORTATION KEPT YOU FROM MEDICAL APPOINTMENTS OR FROM GETTING MEDICATIONS?: NO

## 2025-06-30 ENCOUNTER — OFFICE VISIT (OUTPATIENT)
Age: 30
End: 2025-06-30
Payer: COMMERCIAL

## 2025-06-30 VITALS
BODY MASS INDEX: 40.78 KG/M2 | OXYGEN SATURATION: 97 % | TEMPERATURE: 98.9 F | DIASTOLIC BLOOD PRESSURE: 86 MMHG | WEIGHT: 259.8 LBS | HEIGHT: 67 IN | RESPIRATION RATE: 20 BRPM | HEART RATE: 113 BPM | SYSTOLIC BLOOD PRESSURE: 129 MMHG

## 2025-06-30 DIAGNOSIS — L65.9 HAIR LOSS: ICD-10-CM

## 2025-06-30 DIAGNOSIS — J30.2 SEASONAL ALLERGIC RHINITIS, UNSPECIFIED TRIGGER: ICD-10-CM

## 2025-06-30 DIAGNOSIS — M25.471 BILATERAL SWELLING OF FEET AND ANKLES: ICD-10-CM

## 2025-06-30 DIAGNOSIS — R03.0 ELEVATED BP WITHOUT DIAGNOSIS OF HYPERTENSION: ICD-10-CM

## 2025-06-30 DIAGNOSIS — M25.474 BILATERAL SWELLING OF FEET AND ANKLES: ICD-10-CM

## 2025-06-30 DIAGNOSIS — K21.9 GASTROESOPHAGEAL REFLUX DISEASE WITHOUT ESOPHAGITIS: ICD-10-CM

## 2025-06-30 DIAGNOSIS — E55.9 VITAMIN D DEFICIENCY: ICD-10-CM

## 2025-06-30 DIAGNOSIS — M25.475 BILATERAL SWELLING OF FEET AND ANKLES: ICD-10-CM

## 2025-06-30 DIAGNOSIS — Z00.00 WELL ADULT EXAM: Primary | ICD-10-CM

## 2025-06-30 DIAGNOSIS — Z13.220 SCREENING FOR CHOLESTEROL LEVEL: ICD-10-CM

## 2025-06-30 DIAGNOSIS — L30.9 ECZEMA, UNSPECIFIED TYPE: ICD-10-CM

## 2025-06-30 DIAGNOSIS — Z13.0 SCREENING FOR DEFICIENCY ANEMIA: ICD-10-CM

## 2025-06-30 DIAGNOSIS — Z13.1 SCREENING FOR DIABETES MELLITUS: ICD-10-CM

## 2025-06-30 DIAGNOSIS — M25.472 BILATERAL SWELLING OF FEET AND ANKLES: ICD-10-CM

## 2025-06-30 PROCEDURE — 99214 OFFICE O/P EST MOD 30 MIN: CPT | Performed by: STUDENT IN AN ORGANIZED HEALTH CARE EDUCATION/TRAINING PROGRAM

## 2025-06-30 PROCEDURE — 99395 PREV VISIT EST AGE 18-39: CPT | Performed by: STUDENT IN AN ORGANIZED HEALTH CARE EDUCATION/TRAINING PROGRAM

## 2025-06-30 RX ORDER — HYDROCHLOROTHIAZIDE 25 MG/1
25 TABLET ORAL EVERY MORNING
Qty: 90 TABLET | Refills: 1 | Status: SHIPPED | OUTPATIENT
Start: 2025-06-30

## 2025-06-30 RX ORDER — TRIAMCINOLONE ACETONIDE 1 MG/G
CREAM TOPICAL
Qty: 80 G | Refills: 1 | Status: SHIPPED | OUTPATIENT
Start: 2025-06-30

## 2025-06-30 RX ORDER — FAMOTIDINE 20 MG/1
20 TABLET, FILM COATED ORAL 2 TIMES DAILY
Qty: 60 TABLET | Refills: 3 | Status: SHIPPED | OUTPATIENT
Start: 2025-06-30

## 2025-06-30 RX ORDER — MONTELUKAST SODIUM 10 MG/1
10 TABLET ORAL DAILY
Qty: 90 TABLET | Refills: 1 | Status: SHIPPED | OUTPATIENT
Start: 2025-06-30

## 2025-06-30 ASSESSMENT — PATIENT HEALTH QUESTIONNAIRE - PHQ9
2. FEELING DOWN, DEPRESSED OR HOPELESS: NOT AT ALL
SUM OF ALL RESPONSES TO PHQ QUESTIONS 1-9: 0
1. LITTLE INTEREST OR PLEASURE IN DOING THINGS: NOT AT ALL

## 2025-06-30 NOTE — PROGRESS NOTES
[facility]  [address] [place] [address] [phone]    Office Visit      Assessment and Plan      Diagnosis Orders   1. Well adult exam  Comprehensive Metabolic Panel    TSH      2. Eczema, unspecified type  triamcinolone (KENALOG) 0.1 % cream      3. Bilateral swelling of feet and ankles  hydroCHLOROthiazide (HYDRODIURIL) 25 MG tablet      4. Hair loss        5. Elevated BP without diagnosis of hypertension        6. Seasonal allergic rhinitis, unspecified trigger  montelukast (SINGULAIR) 10 MG tablet      7. Screening for cholesterol level  Comprehensive Metabolic Panel    Lipid Panel      8. Screening for diabetes mellitus  Hemoglobin A1C      9. Screening for deficiency anemia  CBC      10. Vitamin D deficiency  Vitamin D 25 Hydroxy      11. Gastroesophageal reflux disease without esophagitis  famotidine (PEPCID) 20 MG tablet        All preventative recommendations given in regards to maintaining healthy weight, importance of immunizations, tobacco/alcohol cessation, recommended screenings per USPSTF guidelines, and a health promoting lifestyle. Updated past medical, social and family history as well.        Assessment & Plan  Eczema  -Status: Chronic, stable.  Rash likely due to allergy, possibly environmental. Visible rashes on arms.  --Treatment plan: Triamcinolone cream prescribed. Continue Zyrtec at night, use Singulair during allergy season. Resume Zyrtec if symptoms reappear. Use sunscreen and apply moisturizer over steroid cream when outside.    Edema  -Status: Chronic, uncontrolled.  Swelling in feet and ankles, possibly related to blood pressure. Visible puffiness and redness in ankles.  --Treatment plan: Monitor blood pressure regularly. Hydrochlorothiazide 25 mg prescribed, take half a tablet daily in the morning.    GERD  -Status: Chronic, uncontrolled.  Burning throat sensation with eating/drinking, suggestive of

## 2025-06-30 NOTE — PROGRESS NOTES
Chief Complaint   Patient presents with    Annual Exam       \"Have you been to the ER, urgent care clinic since your last visit?  Hospitalized since your last visit?\"    NO    “Have you seen or consulted any other health care providers outside of [facility] since your last visit?”    NO     “Have you had a pap smear?”    NO    No cervical cancer screening on file             Click Here for Release of Records Request     No results found for this visit on 25.   Vitals:    25 1544 25 1552   BP: (!) 173/81 129/86   Pulse: (!) 113    Resp: 20    Temp: 98.9 °F (37.2 °C)    TempSrc: Temporal    SpO2: 97%    Weight: 117.8 kg (259 lb 12.8 oz)    Height: 1.702 m (5' 7\")       Health Maintenance Due   Topic Date Due    Diabetic foot exam  Never done    Varicella vaccine (1 of 2 - 13+ 2-dose series) Never done    HIV screen  Never done    Diabetic retinal exam  Never done    Hepatitis B vaccine (1 of 3 - 19+ 3-dose series) Never done    DTaP/Tdap/Td vaccine (1 - Tdap) Never done    Pneumococcal 0-49 years Vaccine (2 of 2 - PCV) 10/13/2017    COVID-19 Vaccine (1 - - season) Never done    Depression Screen  2025    Cervical cancer screen  Never done    A1C test (Diabetic or Prediabetic)  2025        The patient, Sofia Franklin, identity was verified by name and .

## 2025-07-03 DIAGNOSIS — E55.9 VITAMIN D DEFICIENCY: ICD-10-CM

## 2025-07-03 DIAGNOSIS — Z13.1 SCREENING FOR DIABETES MELLITUS: ICD-10-CM

## 2025-07-03 DIAGNOSIS — Z13.0 SCREENING FOR DEFICIENCY ANEMIA: ICD-10-CM

## 2025-07-03 DIAGNOSIS — Z00.00 WELL ADULT EXAM: ICD-10-CM

## 2025-07-03 DIAGNOSIS — Z13.220 SCREENING FOR CHOLESTEROL LEVEL: ICD-10-CM

## 2025-07-04 LAB
25(OH)D3 SERPL-MCNC: 39.2 NG/ML (ref 30–100)
ALBUMIN SERPL-MCNC: 3.3 G/DL (ref 3.5–5)
ALBUMIN/GLOB SERPL: 1 (ref 1.1–2.2)
ALP SERPL-CCNC: 128 U/L (ref 45–117)
ALT SERPL-CCNC: 29 U/L (ref 12–78)
ANION GAP SERPL CALC-SCNC: 6 MMOL/L (ref 2–12)
AST SERPL-CCNC: 17 U/L (ref 15–37)
BILIRUB SERPL-MCNC: 0.5 MG/DL (ref 0.2–1)
BUN SERPL-MCNC: 11 MG/DL (ref 6–20)
BUN/CREAT SERPL: 11 (ref 12–20)
CALCIUM SERPL-MCNC: 8.8 MG/DL (ref 8.5–10.1)
CHLORIDE SERPL-SCNC: 106 MMOL/L (ref 97–108)
CHOLEST SERPL-MCNC: 150 MG/DL
CO2 SERPL-SCNC: 24 MMOL/L (ref 21–32)
CREAT SERPL-MCNC: 1 MG/DL (ref 0.55–1.02)
ERYTHROCYTE [DISTWIDTH] IN BLOOD BY AUTOMATED COUNT: 13.9 % (ref 11.5–14.5)
EST. AVERAGE GLUCOSE BLD GHB EST-MCNC: 214 MG/DL
GLOBULIN SER CALC-MCNC: 3.3 G/DL (ref 2–4)
GLUCOSE SERPL-MCNC: 301 MG/DL (ref 65–100)
HBA1C MFR BLD: 9.1 % (ref 4–5.6)
HCT VFR BLD AUTO: 35.3 % (ref 35–47)
HDLC SERPL-MCNC: 64 MG/DL
HDLC SERPL: 2.3 (ref 0–5)
HGB BLD-MCNC: 11.2 G/DL (ref 11.5–16)
LDLC SERPL CALC-MCNC: 70.4 MG/DL (ref 0–100)
MCH RBC QN AUTO: 24.5 PG (ref 26–34)
MCHC RBC AUTO-ENTMCNC: 31.7 G/DL (ref 30–36.5)
MCV RBC AUTO: 77.2 FL (ref 80–99)
NRBC # BLD: 0 K/UL (ref 0–0.01)
NRBC BLD-RTO: 0 PER 100 WBC
PLATELET # BLD AUTO: 344 K/UL (ref 150–400)
PMV BLD AUTO: 10.6 FL (ref 8.9–12.9)
POTASSIUM SERPL-SCNC: 4.7 MMOL/L (ref 3.5–5.1)
PROT SERPL-MCNC: 6.6 G/DL (ref 6.4–8.2)
RBC # BLD AUTO: 4.57 M/UL (ref 3.8–5.2)
SODIUM SERPL-SCNC: 136 MMOL/L (ref 136–145)
TRIGL SERPL-MCNC: 78 MG/DL
TSH SERPL DL<=0.05 MIU/L-ACNC: 1.1 UIU/ML (ref 0.36–3.74)
VLDLC SERPL CALC-MCNC: 15.6 MG/DL
WBC # BLD AUTO: 8 K/UL (ref 3.6–11)